# Patient Record
Sex: FEMALE | Race: WHITE | URBAN - METROPOLITAN AREA
[De-identification: names, ages, dates, MRNs, and addresses within clinical notes are randomized per-mention and may not be internally consistent; named-entity substitution may affect disease eponyms.]

---

## 2017-07-17 ENCOUNTER — EMERGENCY (EMERGENCY)
Facility: HOSPITAL | Age: 37
LOS: 0 days | Discharge: HOME | End: 2017-07-18

## 2017-07-17 DIAGNOSIS — R50.9 FEVER, UNSPECIFIED: ICD-10-CM

## 2017-07-17 DIAGNOSIS — E78.00 PURE HYPERCHOLESTEROLEMIA, UNSPECIFIED: ICD-10-CM

## 2017-07-17 DIAGNOSIS — M79.605 PAIN IN LEFT LEG: ICD-10-CM

## 2017-07-17 DIAGNOSIS — R07.89 OTHER CHEST PAIN: ICD-10-CM

## 2017-07-17 DIAGNOSIS — R60.0 LOCALIZED EDEMA: ICD-10-CM

## 2017-07-17 DIAGNOSIS — M79.604 PAIN IN RIGHT LEG: ICD-10-CM

## 2017-07-17 DIAGNOSIS — I10 ESSENTIAL (PRIMARY) HYPERTENSION: ICD-10-CM

## 2017-07-17 DIAGNOSIS — R00.2 PALPITATIONS: ICD-10-CM

## 2017-07-27 PROBLEM — Z00.00 ENCOUNTER FOR PREVENTIVE HEALTH EXAMINATION: Status: ACTIVE | Noted: 2017-07-27

## 2019-03-25 ENCOUNTER — OUTPATIENT (OUTPATIENT)
Dept: OUTPATIENT SERVICES | Facility: HOSPITAL | Age: 39
LOS: 1 days | Discharge: HOME | End: 2019-03-25

## 2019-03-25 DIAGNOSIS — R06.9 UNSPECIFIED ABNORMALITIES OF BREATHING: ICD-10-CM

## 2019-03-31 ENCOUNTER — OUTPATIENT (OUTPATIENT)
Dept: OUTPATIENT SERVICES | Facility: HOSPITAL | Age: 39
LOS: 1 days | Discharge: HOME | End: 2019-03-31

## 2019-03-31 DIAGNOSIS — R10.30 LOWER ABDOMINAL PAIN, UNSPECIFIED: ICD-10-CM

## 2019-04-04 ENCOUNTER — EMERGENCY (EMERGENCY)
Facility: HOSPITAL | Age: 39
LOS: 0 days | Discharge: HOME | End: 2019-04-05
Attending: EMERGENCY MEDICINE | Admitting: PHYSICIAN ASSISTANT
Payer: COMMERCIAL

## 2019-04-04 VITALS
SYSTOLIC BLOOD PRESSURE: 184 MMHG | RESPIRATION RATE: 18 BRPM | TEMPERATURE: 97 F | DIASTOLIC BLOOD PRESSURE: 112 MMHG | OXYGEN SATURATION: 100 % | HEART RATE: 72 BPM

## 2019-04-04 DIAGNOSIS — S09.90XA UNSPECIFIED INJURY OF HEAD, INITIAL ENCOUNTER: ICD-10-CM

## 2019-04-04 DIAGNOSIS — I10 ESSENTIAL (PRIMARY) HYPERTENSION: ICD-10-CM

## 2019-04-04 DIAGNOSIS — R55 SYNCOPE AND COLLAPSE: ICD-10-CM

## 2019-04-04 DIAGNOSIS — R06.02 SHORTNESS OF BREATH: ICD-10-CM

## 2019-04-04 DIAGNOSIS — S89.92XA UNSPECIFIED INJURY OF LEFT LOWER LEG, INITIAL ENCOUNTER: ICD-10-CM
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
## 2019-04-04 DIAGNOSIS — S99.921A UNSPECIFIED INJURY OF RIGHT FOOT, INITIAL ENCOUNTER: ICD-10-CM

## 2019-04-04 LAB
ALBUMIN SERPL ELPH-MCNC: 4.8 G/DL — SIGNIFICANT CHANGE UP (ref 3.5–5.2)
ALP SERPL-CCNC: 90 U/L — SIGNIFICANT CHANGE UP (ref 30–115)
ALT FLD-CCNC: 34 U/L — SIGNIFICANT CHANGE UP (ref 0–41)
ANION GAP SERPL CALC-SCNC: 13 MMOL/L — SIGNIFICANT CHANGE UP (ref 7–14)
APTT BLD: 28.1 SEC — SIGNIFICANT CHANGE UP (ref 27–39.2)
AST SERPL-CCNC: 22 U/L — SIGNIFICANT CHANGE UP (ref 0–41)
BILIRUB SERPL-MCNC: 0.4 MG/DL — SIGNIFICANT CHANGE UP (ref 0.2–1.2)
BUN SERPL-MCNC: 17 MG/DL — SIGNIFICANT CHANGE UP (ref 10–20)
CALCIUM SERPL-MCNC: 10.4 MG/DL — HIGH (ref 8.5–10.1)
CHLORIDE SERPL-SCNC: 98 MMOL/L — SIGNIFICANT CHANGE UP (ref 98–110)
CO2 SERPL-SCNC: 26 MMOL/L — SIGNIFICANT CHANGE UP (ref 17–32)
CREAT SERPL-MCNC: 0.9 MG/DL — SIGNIFICANT CHANGE UP (ref 0.7–1.5)
D DIMER BLD IA.RAPID-MCNC: 108 NG/ML DDU — SIGNIFICANT CHANGE UP (ref 0–230)
GLUCOSE SERPL-MCNC: 109 MG/DL — HIGH (ref 70–99)
HCG SERPL QL: NEGATIVE — SIGNIFICANT CHANGE UP
HCT VFR BLD CALC: 42.5 % — SIGNIFICANT CHANGE UP (ref 37–47)
HGB BLD-MCNC: 14.2 G/DL — SIGNIFICANT CHANGE UP (ref 12–16)
INR BLD: 0.9 RATIO — SIGNIFICANT CHANGE UP (ref 0.65–1.3)
MAGNESIUM SERPL-MCNC: 2.3 MG/DL — SIGNIFICANT CHANGE UP (ref 1.8–2.4)
MCHC RBC-ENTMCNC: 28.5 PG — SIGNIFICANT CHANGE UP (ref 27–31)
MCHC RBC-ENTMCNC: 33.4 G/DL — SIGNIFICANT CHANGE UP (ref 32–37)
MCV RBC AUTO: 85.2 FL — SIGNIFICANT CHANGE UP (ref 81–99)
NRBC # BLD: 0 /100 WBCS — SIGNIFICANT CHANGE UP (ref 0–0)
PLATELET # BLD AUTO: 336 K/UL — SIGNIFICANT CHANGE UP (ref 130–400)
POTASSIUM SERPL-MCNC: 4.4 MMOL/L — SIGNIFICANT CHANGE UP (ref 3.5–5)
POTASSIUM SERPL-SCNC: 4.4 MMOL/L — SIGNIFICANT CHANGE UP (ref 3.5–5)
PROT SERPL-MCNC: 8.2 G/DL — HIGH (ref 6–8)
PROTHROM AB SERPL-ACNC: 10.4 SEC — SIGNIFICANT CHANGE UP (ref 9.95–12.87)
RBC # BLD: 4.99 M/UL — SIGNIFICANT CHANGE UP (ref 4.2–5.4)
RBC # FLD: 12.4 % — SIGNIFICANT CHANGE UP (ref 11.5–14.5)
SODIUM SERPL-SCNC: 137 MMOL/L — SIGNIFICANT CHANGE UP (ref 135–146)
TROPONIN T SERPL-MCNC: <0.01 NG/ML — SIGNIFICANT CHANGE UP
WBC # BLD: 9.53 K/UL — SIGNIFICANT CHANGE UP (ref 4.8–10.8)
WBC # FLD AUTO: 9.53 K/UL — SIGNIFICANT CHANGE UP (ref 4.8–10.8)

## 2019-04-04 PROCEDURE — 73630 X-RAY EXAM OF FOOT: CPT | Mod: 26,RT

## 2019-04-04 PROCEDURE — 73564 X-RAY EXAM KNEE 4 OR MORE: CPT | Mod: 26,LT

## 2019-04-04 PROCEDURE — 70450 CT HEAD/BRAIN W/O DYE: CPT | Mod: 26

## 2019-04-04 PROCEDURE — 36410 VNPNXR 3YR/> PHY/QHP DX/THER: CPT | Mod: 59

## 2019-04-04 PROCEDURE — 99218: CPT | Mod: 25

## 2019-04-04 PROCEDURE — 93010 ELECTROCARDIOGRAM REPORT: CPT

## 2019-04-04 PROCEDURE — 73610 X-RAY EXAM OF ANKLE: CPT | Mod: 26,RT

## 2019-04-04 PROCEDURE — 71045 X-RAY EXAM CHEST 1 VIEW: CPT | Mod: 26

## 2019-04-04 PROCEDURE — 11760 REPAIR OF NAIL BED: CPT

## 2019-04-04 RX ORDER — OXYCODONE AND ACETAMINOPHEN 5; 325 MG/1; MG/1
2 TABLET ORAL ONCE
Qty: 0 | Refills: 0 | Status: DISCONTINUED | OUTPATIENT
Start: 2019-04-04 | End: 2019-04-04

## 2019-04-04 RX ORDER — MORPHINE SULFATE 50 MG/1
4 CAPSULE, EXTENDED RELEASE ORAL ONCE
Qty: 0 | Refills: 0 | Status: DISCONTINUED | OUTPATIENT
Start: 2019-04-04 | End: 2019-04-04

## 2019-04-04 RX ORDER — TETANUS TOXOID, REDUCED DIPHTHERIA TOXOID AND ACELLULAR PERTUSSIS VACCINE, ADSORBED 5; 2.5; 8; 8; 2.5 [IU]/.5ML; [IU]/.5ML; UG/.5ML; UG/.5ML; UG/.5ML
0.5 SUSPENSION INTRAMUSCULAR ONCE
Qty: 0 | Refills: 0 | Status: COMPLETED | OUTPATIENT
Start: 2019-04-04 | End: 2019-04-04

## 2019-04-04 RX ADMIN — Medication 300 MILLIGRAM(S): at 22:51

## 2019-04-04 RX ADMIN — MORPHINE SULFATE 4 MILLIGRAM(S): 50 CAPSULE, EXTENDED RELEASE ORAL at 23:10

## 2019-04-04 RX ADMIN — TETANUS TOXOID, REDUCED DIPHTHERIA TOXOID AND ACELLULAR PERTUSSIS VACCINE, ADSORBED 0.5 MILLILITER(S): 5; 2.5; 8; 8; 2.5 SUSPENSION INTRAMUSCULAR at 22:49

## 2019-04-04 RX ADMIN — MORPHINE SULFATE 4 MILLIGRAM(S): 50 CAPSULE, EXTENDED RELEASE ORAL at 20:54

## 2019-04-04 NOTE — ED PROVIDER NOTE - PROGRESS NOTE DETAILS
pt agreeable to obs.  follow up with podiatry clinic in 1 week.  abx given in ED.  post op shoe given.  xeroform and bacitracin to foot x 1 week.  wash daily.  discussed s/s of infection.

## 2019-04-04 NOTE — ED CDU PROVIDER INITIAL DAY NOTE - ATTENDING CONTRIBUTION TO CARE
38yo woman h/o HTN was placed in CDU for workup of syncope. Pt reports that she was climbing stairs, vision got black, and she passed out. No preceding HA or chest pain, no prior episodes. Pt fell down stairs and injuried R great toe, L knee, and hit head. She was eval in the ED with EKG, labs, CXR which were unremarkable, head CT was negative for injury; great toenail was avulsed but no fx. Wound care was given. Plan in CDU for monitoring, serial enzymes/EKG, echo, reassess.

## 2019-04-04 NOTE — ED PROVIDER NOTE - NS ED ROS FT
Constitutional: See HPI.  Eyes: No visual changes, eye pain or discharge.  ENMT: No hearing changes, pain, discharge or infections.   Cardiac: + syncope. No SOB or edema. No chest pain with exertion.  Respiratory: No cough or respiratory distress. No hemoptysis.   GI: No nausea, vomiting, diarrhea or abdominal pain.  : No dysuria, frequency or burning.  MS: + knee and foot pain. No myalgia, muscle weakness, or back pain, no pain of upper extremities, hip.   Neuro: No headache or weakness.   Skin: No skin rash.  Except as documented in the HPI, all other systems are negative.

## 2019-04-04 NOTE — ED ADULT TRIAGE NOTE - CHIEF COMPLAINT QUOTE
Pt presents s/p fall, pt states "I blacked out and fell down 4 steps". Pt presents s/p fall, pt states "I blacked out and fell down 4 steps".  Pt c/o headaches and right 1st digit toenail imbedded into nailbed

## 2019-04-04 NOTE — ED PROVIDER NOTE - PHYSICAL EXAMINATION
CONST: Well appearing in NAD  HEAD: NC/AT   EYES:  Sclera and conjunctiva clear.  NECK: Non-tender, no midline C/T/L tenderness  CARD: Normal S1 S2; Normal rate and rhythm  RESP: Equal BS B/L, No wheezes, rhonchi or rales. No distress  GI: Soft, non-tender, non-distended.  MS: + TTP left patella, no stepoff or crepitus, + nailbed injury of right great toe with no laceration, + minimal bleeding, no FB, diffuse TTP of R great toe and lateral malleolus, pedal pulse 2+ bilaterally, capillary refill < 2 seconds, Normal ROM in all extremities. No edema of lower extremities, no calf pain, radial pulses 2+ bilaterally  SKIN: Warm, dry, no acute rashes. Good turgor  NEURO: A&Ox3, CN II-XII intact, no focal deficits, no facial asymmetry, no pronator drift, normal finger to nose, no nystagmus, gross sensation intact, UE/LE strength 5/5, stable gait

## 2019-04-04 NOTE — ED PROCEDURE NOTE - ATTENDING CONTRIBUTION TO CARE
I was present for and supervised the key/critical aspects of the procedures performed during the care of the patient or was immediately available to the resident/PA for this procedure.

## 2019-04-04 NOTE — ED CDU PROVIDER INITIAL DAY NOTE - OBJECTIVE STATEMENT
38y/o female with pmh htn, pt. states she had syncopal episode earlier today as she was walking up the stair and she slid down  4-5 steps hitting her head on a railing and injuring her right great toe and left knee. pt. states that over the last 2 weeks she has been having sob and wheezing only at night time. denies cp, nausea, vomiting, incontinence, abdominal pain.

## 2019-04-04 NOTE — ED CDU PROVIDER INITIAL DAY NOTE - MEDICAL DECISION MAKING DETAILS
40yo woman h/o HTN was placed in CDU for workup of syncope. Pt reports that she was climbing stairs, vision got black, and she passed out. No preceding HA or chest pain, no prior episodes. Pt fell down stairs and injuried R great toe, L knee, and hit head. She was eval in the ED with EKG, labs, CXR which were unremarkable, head CT was negative for injury; great toenail was avulsed but no fx. Wound care was given. Plan in CDU for monitoring, serial enzymes/EKG, echo, reassess.

## 2019-04-04 NOTE — ED PROVIDER NOTE - ATTENDING CONTRIBUTION TO CARE
40 yo f with pmh of htn, presents with syncope while going up the stairs.  pt says blacked out and hit her head on the railing and stubbed her right big toe and landed on her left knee.  pt denies asa or AC use.  pt denies dizziness or chest pain prior to syncopal episode.  was well before incident.  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  no neck pain, abd pain, sob, no recent travel, no ocp use.  exam: nad, ncat, perrl, eomi, mmm, rrr, ctab, abd soft, nt,nd aox3, right large toe with partial nail avulsion, left knee ttp patella, FROM with pain, imp: pt with syncopal episode, right toe injury, left knee pain, labs, ekg, cxr, ct head , xray of right foot and left knee

## 2019-04-04 NOTE — ED PROCEDURE NOTE - NS ED PROCEDURE ASSISTED BY
Supervision was available
The procedure was performed independently

## 2019-04-04 NOTE — ED CDU PROVIDER INITIAL DAY NOTE - MUSCULOSKELETAL, MLM
Spine appears normal, range of motion is not limited, + ttp over left knee, no swelling, right toe repaired nailbed.

## 2019-04-04 NOTE — ED ADULT NURSE NOTE - CHIEF COMPLAINT QUOTE
Pt presents s/p fall, pt states "I blacked out and fell down 4 steps".  Pt c/o headaches and right 1st digit toenail imbedded into nailbed

## 2019-04-04 NOTE — ED PROCEDURE NOTE - PROCEDURE ADDITIONAL DETAILS
18 g R forearm placed with US guidance with flash back, labs drawn, line flushed without difficulty.

## 2019-04-04 NOTE — ED PROCEDURE NOTE - GENERAL PROCEDURE DETAILS
nail hanging on by cuticle.  digital block of right great toe and removal of toe nail.  no need for cutting nail.  nail removed cleanly. irrigated and bandaged with bacitracin and xeroform.

## 2019-04-04 NOTE — ED PROCEDURE NOTE - CPROC ED POST PROC CARE GUIDE1
Keep the cast/splint/dressing clean and dry.
Verbal/written post procedure instructions were given to patient/caregiver./Instructed patient/caregiver regarding signs and symptoms of infection./Instructed patient/caregiver to follow-up with primary care physician./Keep the cast/splint/dressing clean and dry.
Verbal/written post procedure instructions were given to patient/caregiver./Keep the cast/splint/dressing clean and dry./Instructed patient/caregiver regarding signs and symptoms of infection./Instructed patient/caregiver to follow-up with primary care physician./Elevate the injured extremity as instructed.

## 2019-04-04 NOTE — ED PROVIDER NOTE - CARE PLAN
Principal Discharge DX:	Syncope  Secondary Diagnosis:	Closed head injury  Secondary Diagnosis:	Knee pain  Secondary Diagnosis:	Tetanus toxoid vaccination administered at current visit  Secondary Diagnosis:	Toe injury, right, initial encounter

## 2019-04-04 NOTE — ED PROVIDER NOTE - CLINICAL SUMMARY MEDICAL DECISION MAKING FREE TEXT BOX
Pt with syncopal episode while walking up stairs, closed head injury but neg ct, +right toe injury with nail avulsion, agreeable to obs for syncope w/u

## 2019-04-04 NOTE — ED PROVIDER NOTE - OBJECTIVE STATEMENT
39 y.o female w/ hx of HTn presents to the ED for evaluation of syncope.  Pt was walking up the stairs, had syncopal episode with no preceding symptoms w/ + head injury on no anticoagulation/ antiplatelets. Also complaining of left knee pain and right great toe pain.  pain is dull/achy, intermittent, throbbing, worse w/ ROM, alleviated with rest, mild severity.  + nailbed injury right great toe. not utd on tdap.

## 2019-04-04 NOTE — ED PROVIDER NOTE - SECONDARY DIAGNOSIS.
Closed head injury Knee pain Tetanus toxoid vaccination administered at current visit Toe injury, right, initial encounter

## 2019-04-05 VITALS
OXYGEN SATURATION: 96 % | HEART RATE: 66 BPM | TEMPERATURE: 98 F | RESPIRATION RATE: 18 BRPM | SYSTOLIC BLOOD PRESSURE: 120 MMHG | DIASTOLIC BLOOD PRESSURE: 77 MMHG

## 2019-04-05 LAB — TROPONIN T SERPL-MCNC: <0.01 NG/ML — SIGNIFICANT CHANGE UP

## 2019-04-05 PROCEDURE — 99217: CPT | Mod: 25

## 2019-04-05 NOTE — ED ADULT NURSE REASSESSMENT NOTE - NS ED NURSE REASSESS COMMENT FT1
patient placed in observation unit for further observation for syncope. Patient placed on cardiac monitor.  Blood draw and sent to lab, result pending. Patient in stable condition and nad.  Will continue to monitor.

## 2019-04-05 NOTE — ED CDU PROVIDER DISPOSITION NOTE - NSFOLLOWUPCLINICS_GEN_ALL_ED_FT
[facility]  Podiatry  .  [place]   Phone: [phone]  Fax:   Follow Up Time: 4-6 Days

## 2019-04-05 NOTE — ED CDU PROVIDER DISPOSITION NOTE - CARE PROVIDERS DIRECT ADDRESSES
,DirectAddress_Unknown,[email or web address]

## 2019-04-05 NOTE — ED CDU PROVIDER DISPOSITION NOTE - ATTENDING CONTRIBUTION TO CARE
40yo woman h/o HTN was placed in CDU for workup of syncope. Pt reports that she was climbing stairs, vision got black, and she passed out. No preceding HA or chest pain, no prior episodes. Pt fell down stairs and injuried R great toe, L knee, and hit head. She was eval in the ED with EKG, labs, CXR which were unremarkable, head CT was negative for injury; great toenail was avulsed but no fx. Wound care was given. Pt monitored without incident, repeat EKG and enzymes unchanged. Echo unremarkable. Pt to f/u podiatry and cardiology, PMD. Clindamycin was eprescribed.

## 2019-04-05 NOTE — ED CDU PROVIDER SUBSEQUENT DAY NOTE - ATTENDING CONTRIBUTION TO CARE
38yo woman h/o HTN was placed in CDU for workup of syncope. Pt reports that she was climbing stairs, vision got black, and she passed out. No preceding HA or chest pain, no prior episodes. Pt fell down stairs and injuried R great toe, L knee, and hit head. She was eval in the ED with EKG, labs, CXR which were unremarkable, head CT was negative for injury; great toenail was avulsed but no fx. Wound care was given. Pt monitored without incident, repeat EKG and enzymes unchanged. Echo unremarkable. Pt to f/u podiatry and cardiology, PMD. Clindamycin was eprescribed.

## 2019-04-05 NOTE — ED CDU PROVIDER DISPOSITION NOTE - CARE PROVIDER_API CALL
your PMD 1-2 days,   Phone: (   )    -  Fax: (   )    -  Follow Up Time:     Sung Cuenca)  Cardiovascular Disease; Internal Medicine  [address] [place] [address]  Phone: [phone]  Fax: [phone]  Follow Up Time: 1-3 Days

## 2019-04-05 NOTE — ED CDU PROVIDER SUBSEQUENT DAY NOTE - PROGRESS NOTE DETAILS
pt. with syncope, 2nd trop pending, needs echo. will continue to reassess. Patient comfortable, no complaints. Patient reports that her BP meds were changed this week. Lisinopril was d/c and 2 new meds were started. Patient to be discharged home with copies of results and outpatient f/u with PMD/cards/podiatry.

## 2019-04-05 NOTE — ED ADULT NURSE REASSESSMENT NOTE - NS ED NURSE REASSESS COMMENT FT1
patient sleeping with no difficulty breathing noted. Patient remains on cardiac monitor.  Patient in stable condition and nad.  Will continue to monitor.

## 2019-04-05 NOTE — ED CDU PROVIDER DISPOSITION NOTE - PROVIDER TOKENS
FREE:[LAST:[your PMD 1-2 days],PHONE:[(   )    -],FAX:[(   )    -]],PROVIDER:[TOKEN:[[id number]],FOLLOWUP:[1-3 Days]]

## 2019-04-05 NOTE — ED CDU PROVIDER DISPOSITION NOTE - NSFOLLOWUPINSTRUCTIONS_ED_ALL_ED_FT
Syncope    Syncope is when you temporarily lose consciousness, also called fainting or passing out. It is caused by a sudden decrease in blood flow to the brain. Even though most causes of syncope are not dangerous, syncope can possibly be a sign of a serious medical problem. Signs that you may be about to faint include feeling dizzy, lightheaded, nausea, visual changes, or cold/clammy skin. Do not drive, operate heavy machinery, or play sports until your health care provider says it is okay.    SEEK IMMEDIATE MEDICAL CARE IF YOU HAVE ANY OF THE FOLLOWING SYMPTOMS: severe headache, pain in your chest/abdomen/back, bleeding from your mouth or rectum, palpitations, shortness of breath, pain with breathing, seizure, confusion, or trouble walking.    Wound Care    Taking care of your wound properly can help to prevent pain and infection. It can also help your wound to heal more quickly.     HOW TO CARE FOR YOUR WOUND   Take or apply over-the-counter and prescription medicines only as told by your health care provider.  If you were prescribed antibiotic medicine, take or apply it as told by your health care provider. Do not stop using the antibiotic even if your condition improves.  Clean the wound each day or as told by your health care provider.  Wash the wound with mild soap and water.  Rinse the wound with water to remove all soap.  Pat the wound dry with a clean towel. Do not rub it.  There are many different ways to close and cover a wound. For example, a wound can be covered with stitches (sutures), skin glue, or adhesive strips. Follow instructions from your health care provider about:  How to take care of your wound.  When and how you should change your bandage (dressing).  When you should remove your dressing.  Removing whatever was used to close your wound.  Check your wound every day for signs of infection. Watch for:  Redness, swelling, or pain.  Fluid, blood, or pus.  Keep the dressing dry until your health care provider says it can be removed. Do not take baths, swim, use a hot tub, or do anything that would put your wound underwater until your health care provider approves.  Raise (elevate) the injured area above the level of your heart while you are sitting or lying down.  Do not scratch or pick at the wound.  Keep all follow-up visits as told by your health care provider. This is important.    SEEK MEDICAL CARE IF:  You received a tetanus shot and you have swelling, severe pain, redness, or bleeding at the injection site.  You have a fever.  Your pain is not controlled with medicine.  You have increased redness, swelling, or pain at the site of your wound.  You have fluid, blood, or pus coming from your wound.  You notice a bad smell coming from your wound or your dressing.    SEEK IMMEDIATE MEDICAL CARE IF:  You have a red streak going away from your wound.

## 2019-04-09 PROBLEM — I10 ESSENTIAL (PRIMARY) HYPERTENSION: Chronic | Status: ACTIVE | Noted: 2019-04-04

## 2019-04-12 ENCOUNTER — APPOINTMENT (OUTPATIENT)
Dept: PODIATRY | Facility: CLINIC | Age: 39
End: 2019-04-12
Payer: COMMERCIAL

## 2019-04-12 ENCOUNTER — OUTPATIENT (OUTPATIENT)
Dept: OUTPATIENT SERVICES | Facility: HOSPITAL | Age: 39
LOS: 1 days | Discharge: HOME | End: 2019-04-12

## 2019-04-12 DIAGNOSIS — G89.29 PAIN IN RIGHT FOOT: ICD-10-CM

## 2019-04-12 DIAGNOSIS — M79.671 PAIN IN RIGHT FOOT: ICD-10-CM

## 2019-04-12 DIAGNOSIS — Z82.49 FAMILY HISTORY OF ISCHEMIC HEART DISEASE AND OTHER DISEASES OF THE CIRCULATORY SYSTEM: ICD-10-CM

## 2019-04-12 DIAGNOSIS — S99.929A UNSPECIFIED INJURY OF UNSPECIFIED FOOT, INITIAL ENCOUNTER: ICD-10-CM

## 2019-04-12 PROCEDURE — 99203 OFFICE O/P NEW LOW 30 MIN: CPT

## 2019-04-12 RX ORDER — MELOXICAM 15 MG/1
15 TABLET ORAL
Qty: 15 | Refills: 0 | Status: ACTIVE | COMMUNITY
Start: 2019-04-12 | End: 1900-01-01

## 2019-04-15 PROBLEM — S99.929A: Status: ACTIVE | Noted: 2019-04-15

## 2019-04-15 PROBLEM — M79.671 CHRONIC PAIN OF RIGHT HEEL: Status: ACTIVE | Noted: 2019-04-12

## 2019-04-15 NOTE — HISTORY OF PRESENT ILLNESS
[FreeTextEntry1] : A 40 y/o female presents to clinic and was referred by ED doctor. she states that she lost consciousness and fell down at home last Thursday and injured her right 1st toenail.  she has history of high blood pressure and has an appt with cardiologist. she also complaints of right chronic heel pain, she states that she has this right chronic heel pain prior to incident.

## 2019-04-15 NOTE — REASON FOR VISIT
[Initial Evaluation] : an initial evaluation [FreeTextEntry1] : total nail avulsion right 1st toenail  due to trauma 4/4/19

## 2019-04-15 NOTE — PHYSICAL EXAM
[General Appearance - Alert] : alert [Full Pulse] : the pedal pulses are present [Oriented To Time, Place, And Person] : oriented to person, place, and time [FreeTextEntry1] : pain on palpation right hallux dorsally and right plantar heel.

## 2019-05-02 ENCOUNTER — APPOINTMENT (OUTPATIENT)
Dept: PODIATRY | Facility: CLINIC | Age: 39
End: 2019-05-02

## 2019-11-24 ENCOUNTER — EMERGENCY (EMERGENCY)
Facility: HOSPITAL | Age: 39
LOS: 0 days | Discharge: HOME | End: 2019-11-25
Attending: STUDENT IN AN ORGANIZED HEALTH CARE EDUCATION/TRAINING PROGRAM | Admitting: STUDENT IN AN ORGANIZED HEALTH CARE EDUCATION/TRAINING PROGRAM
Payer: COMMERCIAL

## 2019-11-24 VITALS
HEART RATE: 115 BPM | SYSTOLIC BLOOD PRESSURE: 200 MMHG | TEMPERATURE: 102 F | DIASTOLIC BLOOD PRESSURE: 100 MMHG | WEIGHT: 229.94 LBS | RESPIRATION RATE: 22 BRPM | OXYGEN SATURATION: 97 % | HEIGHT: 65 IN

## 2019-11-24 DIAGNOSIS — R50.9 FEVER, UNSPECIFIED: ICD-10-CM

## 2019-11-24 DIAGNOSIS — R07.89 OTHER CHEST PAIN: ICD-10-CM

## 2019-11-24 DIAGNOSIS — I10 ESSENTIAL (PRIMARY) HYPERTENSION: ICD-10-CM

## 2019-11-24 DIAGNOSIS — R06.02 SHORTNESS OF BREATH: ICD-10-CM

## 2019-11-24 DIAGNOSIS — M54.9 DORSALGIA, UNSPECIFIED: ICD-10-CM

## 2019-11-24 DIAGNOSIS — Z87.891 PERSONAL HISTORY OF NICOTINE DEPENDENCE: ICD-10-CM

## 2019-11-24 DIAGNOSIS — R09.89 OTHER SPECIFIED SYMPTOMS AND SIGNS INVOLVING THE CIRCULATORY AND RESPIRATORY SYSTEMS: ICD-10-CM

## 2019-11-24 DIAGNOSIS — R10.84 GENERALIZED ABDOMINAL PAIN: ICD-10-CM

## 2019-11-24 DIAGNOSIS — Z88.0 ALLERGY STATUS TO PENICILLIN: ICD-10-CM

## 2019-11-24 DIAGNOSIS — Z88.8 ALLERGY STATUS TO OTHER DRUGS, MEDICAMENTS AND BIOLOGICAL SUBSTANCES STATUS: ICD-10-CM

## 2019-11-24 DIAGNOSIS — R05 COUGH: ICD-10-CM

## 2019-11-24 LAB
ALBUMIN SERPL ELPH-MCNC: 4.7 G/DL — SIGNIFICANT CHANGE UP (ref 3.5–5.2)
ALP SERPL-CCNC: 74 U/L — SIGNIFICANT CHANGE UP (ref 30–115)
ALT FLD-CCNC: 27 U/L — SIGNIFICANT CHANGE UP (ref 0–41)
ANION GAP SERPL CALC-SCNC: 18 MMOL/L — HIGH (ref 7–14)
APPEARANCE UR: CLEAR — SIGNIFICANT CHANGE UP
AST SERPL-CCNC: 24 U/L — SIGNIFICANT CHANGE UP (ref 0–41)
BACTERIA # UR AUTO: ABNORMAL
BASE EXCESS BLDV CALC-SCNC: 2.8 MMOL/L — HIGH (ref -2–2)
BASOPHILS # BLD AUTO: 0.02 K/UL — SIGNIFICANT CHANGE UP (ref 0–0.2)
BASOPHILS NFR BLD AUTO: 0.2 % — SIGNIFICANT CHANGE UP (ref 0–1)
BILIRUB SERPL-MCNC: 0.8 MG/DL — SIGNIFICANT CHANGE UP (ref 0.2–1.2)
BILIRUB UR-MCNC: NEGATIVE — SIGNIFICANT CHANGE UP
BUN SERPL-MCNC: 11 MG/DL — SIGNIFICANT CHANGE UP (ref 10–20)
CA-I SERPL-SCNC: 1.22 MMOL/L — SIGNIFICANT CHANGE UP (ref 1.12–1.3)
CALCIUM SERPL-MCNC: 9.6 MG/DL — SIGNIFICANT CHANGE UP (ref 8.5–10.1)
CHLORIDE SERPL-SCNC: 95 MMOL/L — LOW (ref 98–110)
CO2 SERPL-SCNC: 23 MMOL/L — SIGNIFICANT CHANGE UP (ref 17–32)
COLOR SPEC: SIGNIFICANT CHANGE UP
CREAT SERPL-MCNC: 1 MG/DL — SIGNIFICANT CHANGE UP (ref 0.7–1.5)
DIFF PNL FLD: ABNORMAL
EOSINOPHIL # BLD AUTO: 0.07 K/UL — SIGNIFICANT CHANGE UP (ref 0–0.7)
EOSINOPHIL NFR BLD AUTO: 0.7 % — SIGNIFICANT CHANGE UP (ref 0–8)
EPI CELLS # UR: 6 /HPF — HIGH (ref 0–5)
GAS PNL BLDV: 137 MMOL/L — SIGNIFICANT CHANGE UP (ref 136–145)
GAS PNL BLDV: SIGNIFICANT CHANGE UP
GLUCOSE SERPL-MCNC: 135 MG/DL — HIGH (ref 70–99)
GLUCOSE UR QL: NEGATIVE — SIGNIFICANT CHANGE UP
HCG SERPL-ACNC: <0.6 MIU/ML — SIGNIFICANT CHANGE UP
HCO3 BLDV-SCNC: 28 MMOL/L — SIGNIFICANT CHANGE UP (ref 22–29)
HCT VFR BLD CALC: 41.9 % — SIGNIFICANT CHANGE UP (ref 37–47)
HCT VFR BLDA CALC: 44.2 % — HIGH (ref 34–44)
HGB BLD CALC-MCNC: 14.4 G/DL — SIGNIFICANT CHANGE UP (ref 14–18)
HGB BLD-MCNC: 13.6 G/DL — SIGNIFICANT CHANGE UP (ref 12–16)
HYALINE CASTS # UR AUTO: 2 /LPF — SIGNIFICANT CHANGE UP (ref 0–7)
IMM GRANULOCYTES NFR BLD AUTO: 0.4 % — HIGH (ref 0.1–0.3)
KETONES UR-MCNC: NEGATIVE — SIGNIFICANT CHANGE UP
LACTATE BLDV-MCNC: 2.7 MMOL/L — HIGH (ref 0.5–1.6)
LEUKOCYTE ESTERASE UR-ACNC: NEGATIVE — SIGNIFICANT CHANGE UP
LIDOCAIN IGE QN: 20 U/L — SIGNIFICANT CHANGE UP (ref 7–60)
LYMPHOCYTES # BLD AUTO: 0.42 K/UL — LOW (ref 1.2–3.4)
LYMPHOCYTES # BLD AUTO: 4.1 % — LOW (ref 20.5–51.1)
MAGNESIUM SERPL-MCNC: 1.8 MG/DL — SIGNIFICANT CHANGE UP (ref 1.8–2.4)
MCHC RBC-ENTMCNC: 28.3 PG — SIGNIFICANT CHANGE UP (ref 27–31)
MCHC RBC-ENTMCNC: 32.5 G/DL — SIGNIFICANT CHANGE UP (ref 32–37)
MCV RBC AUTO: 87.3 FL — SIGNIFICANT CHANGE UP (ref 81–99)
MONOCYTES # BLD AUTO: 0.6 K/UL — SIGNIFICANT CHANGE UP (ref 0.1–0.6)
MONOCYTES NFR BLD AUTO: 5.9 % — SIGNIFICANT CHANGE UP (ref 1.7–9.3)
NEUTROPHILS # BLD AUTO: 9.09 K/UL — HIGH (ref 1.4–6.5)
NEUTROPHILS NFR BLD AUTO: 88.7 % — HIGH (ref 42.2–75.2)
NITRITE UR-MCNC: NEGATIVE — SIGNIFICANT CHANGE UP
NRBC # BLD: 0 /100 WBCS — SIGNIFICANT CHANGE UP (ref 0–0)
PCO2 BLDV: 46 MMHG — SIGNIFICANT CHANGE UP (ref 41–51)
PH BLDV: 7.4 — SIGNIFICANT CHANGE UP (ref 7.26–7.43)
PH UR: 7 — SIGNIFICANT CHANGE UP (ref 5–8)
PHOSPHATE SERPL-MCNC: 2.5 MG/DL — SIGNIFICANT CHANGE UP (ref 2.1–4.9)
PLATELET # BLD AUTO: 277 K/UL — SIGNIFICANT CHANGE UP (ref 130–400)
PO2 BLDV: 27 MMHG — SIGNIFICANT CHANGE UP (ref 20–40)
POTASSIUM BLDV-SCNC: 3.8 MMOL/L — SIGNIFICANT CHANGE UP (ref 3.3–5.6)
POTASSIUM SERPL-MCNC: 4.2 MMOL/L — SIGNIFICANT CHANGE UP (ref 3.5–5)
POTASSIUM SERPL-SCNC: 4.2 MMOL/L — SIGNIFICANT CHANGE UP (ref 3.5–5)
PROT SERPL-MCNC: 7.8 G/DL — SIGNIFICANT CHANGE UP (ref 6–8)
PROT UR-MCNC: NEGATIVE — SIGNIFICANT CHANGE UP
RBC # BLD: 4.8 M/UL — SIGNIFICANT CHANGE UP (ref 4.2–5.4)
RBC # FLD: 12.6 % — SIGNIFICANT CHANGE UP (ref 11.5–14.5)
RBC CASTS # UR COMP ASSIST: 21 /HPF — HIGH (ref 0–4)
SAO2 % BLDV: 50 % — SIGNIFICANT CHANGE UP
SODIUM SERPL-SCNC: 136 MMOL/L — SIGNIFICANT CHANGE UP (ref 135–146)
SP GR SPEC: 1.01 — LOW (ref 1.01–1.02)
TROPONIN T SERPL-MCNC: <0.01 NG/ML — SIGNIFICANT CHANGE UP
UROBILINOGEN FLD QL: SIGNIFICANT CHANGE UP
WBC # BLD: 10.24 K/UL — SIGNIFICANT CHANGE UP (ref 4.8–10.8)
WBC # FLD AUTO: 10.24 K/UL — SIGNIFICANT CHANGE UP (ref 4.8–10.8)
WBC UR QL: 3 /HPF — SIGNIFICANT CHANGE UP (ref 0–5)

## 2019-11-24 PROCEDURE — 93010 ELECTROCARDIOGRAM REPORT: CPT

## 2019-11-24 PROCEDURE — 71045 X-RAY EXAM CHEST 1 VIEW: CPT | Mod: 26

## 2019-11-24 PROCEDURE — 99285 EMERGENCY DEPT VISIT HI MDM: CPT

## 2019-11-24 RX ORDER — SODIUM CHLORIDE 9 MG/ML
1000 INJECTION, SOLUTION INTRAVENOUS ONCE
Refills: 0 | Status: DISCONTINUED | OUTPATIENT
Start: 2019-11-24 | End: 2019-11-24

## 2019-11-24 RX ORDER — MORPHINE SULFATE 50 MG/1
4 CAPSULE, EXTENDED RELEASE ORAL ONCE
Refills: 0 | Status: DISCONTINUED | OUTPATIENT
Start: 2019-11-24 | End: 2019-11-24

## 2019-11-24 RX ORDER — SODIUM CHLORIDE 9 MG/ML
3000 INJECTION, SOLUTION INTRAVENOUS ONCE
Refills: 0 | Status: COMPLETED | OUTPATIENT
Start: 2019-11-24 | End: 2019-11-24

## 2019-11-24 RX ORDER — MEROPENEM 1 G/30ML
1000 INJECTION INTRAVENOUS ONCE
Refills: 0 | Status: COMPLETED | OUTPATIENT
Start: 2019-11-24 | End: 2019-11-24

## 2019-11-24 RX ORDER — ACETAMINOPHEN 500 MG
650 TABLET ORAL ONCE
Refills: 0 | Status: COMPLETED | OUTPATIENT
Start: 2019-11-24 | End: 2019-11-24

## 2019-11-24 RX ORDER — VANCOMYCIN HCL 1 G
1000 VIAL (EA) INTRAVENOUS ONCE
Refills: 0 | Status: COMPLETED | OUTPATIENT
Start: 2019-11-24 | End: 2019-11-24

## 2019-11-24 RX ORDER — SODIUM CHLORIDE 9 MG/ML
2000 INJECTION INTRAMUSCULAR; INTRAVENOUS; SUBCUTANEOUS ONCE
Refills: 0 | Status: DISCONTINUED | OUTPATIENT
Start: 2019-11-24 | End: 2019-11-24

## 2019-11-24 RX ADMIN — MEROPENEM 100 MILLIGRAM(S): 1 INJECTION INTRAVENOUS at 22:50

## 2019-11-24 RX ADMIN — MORPHINE SULFATE 4 MILLIGRAM(S): 50 CAPSULE, EXTENDED RELEASE ORAL at 22:08

## 2019-11-24 RX ADMIN — Medication 650 MILLIGRAM(S): at 20:52

## 2019-11-24 RX ADMIN — SODIUM CHLORIDE 1500 MILLILITER(S): 9 INJECTION, SOLUTION INTRAVENOUS at 21:58

## 2019-11-24 RX ADMIN — Medication 250 MILLIGRAM(S): at 23:10

## 2019-11-24 NOTE — ED PROVIDER NOTE - PLAN OF CARE
pt w/ multiple sx including CP rad to back, sharp in nature w/ assoc htn, tachycardia. pt also w/ fever, source unclear. only RF for dissection hx smoking otherwise PE w/o FND or vascular deficits. will workup for sepsis, give empiric fluids/abx, blood/urine ctx, cta dissection given description of pain and to further assess for source

## 2019-11-24 NOTE — ED PROVIDER NOTE - CARE PLAN
Assessment and plan of treatment:	pt w/ multiple sx including CP rad to back, sharp in nature w/ assoc htn, tachycardia. pt also w/ fever, source unclear. only RF for dissection hx smoking otherwise PE w/o FND or vascular deficits. will workup for sepsis, give empiric fluids/abx, blood/urine ctx, cta dissection given description of pain and to further assess for source Principal Discharge DX:	Fever, unspecified fever cause  Assessment and plan of treatment:	pt w/ multiple sx including CP rad to back, sharp in nature w/ assoc htn, tachycardia. pt also w/ fever, source unclear. only RF for dissection hx smoking otherwise PE w/o FND or vascular deficits. will workup for sepsis, give empiric fluids/abx, blood/urine ctx, cta dissection given description of pain and to further assess for source  Secondary Diagnosis:	Chest pain, unspecified type  Secondary Diagnosis:	Back pain, unspecified back location, unspecified back pain laterality, unspecified chronicity

## 2019-11-24 NOTE — ED PROVIDER NOTE - NSFOLLOWUPINSTRUCTIONS_ED_ALL_ED_FT
Take ibuprofen every 6 hours for pain and fever. Take albuterol every 4-6 hours for chest tightness or wheeze. Follow up with your doctor in 2-3 days for reassessment. Return for worsening symptoms, passing out, severe chest pain, or other concerning symptoms.    Fever    A fever is an increase in the body's temperature above 100.4°F (38°C) or higher. In adults and children older than three months, a brief mild or moderate fever generally has no long-term effect, and it usually does not require treatment. Many times, fevers are the result of viral infections, which are self-resolving.  However, certain symptoms or diagnostic tests may suggest a bacterial infection that may respond to antibiotics. Take medications as directed by your health care provider.    SEEK IMMEDIATE MEDICAL CARE IF YOU OR YOUR CHILD HAVE ANY OF THE FOLLOWING SYMPTOMS : shortness of breath, seizure, rash/stiff neck/headache, severe abdominal pain, persistent vomiting, any signs of dehydration, or if your child has a fever for over five (5) days.        Chest Pain    Chest pain can be caused by many different conditions which may or may not be dangerous. Causes include heartburn, lung infections, heart attack, blood clot in lungs, skin infections, strain or damage to muscle, cartilage, or bones, etc. In addition to a history and physical examination, an electrocardiogram (ECG) or other lab tests may have been performed to determine the cause of your chest pain. Follow up with your primary care provider or with a cardiologist as instructed.     SEEK IMMEDIATE MEDICAL CARE IF YOU HAVE ANY OF THE FOLLOWING SYMPTOMS: worsening chest pain, coughing up blood, unexplained back/neck/jaw pain, severe abdominal pain, dizziness or lightheadedness, fainting, shortness of breath, sweaty or clammy skin, vomiting, or racing heart beat. These symptoms may represent a serious problem that is an emergency. Do not wait to see if the symptoms will go away. Get medical help right away. Call 911 and do not drive yourself to the hospital.

## 2019-11-24 NOTE — ED PROVIDER NOTE - NSFOLLOWUPCLINICS_GEN_ALL_ED_FT
[facility]  Medicine  [address], [place]   Phone: [phone]  Fax:   Follow Up Time:

## 2019-11-24 NOTE — ED PROVIDER NOTE - NS ED ROS FT
Constitutional: pos fever no rigors  Eyes: no eye redness, acute visual change  ENMT: no ear pain, no throat pain  Card: pos chest pain, no palpitations  Pulm: pos cough, pos shortness of breath  GI: pos abdominal pain, nonausea or vomiting  : no dysuria or hematuria  MSK: no limitation in range of motion, no neck pain, + myalgia  Skin: no rash, no abrasion  Neuro: no numbness, no weakness  Heme/Onc: no easy bruising, no bleeding tendency   Allergic: no hives, no throat swelling

## 2019-11-24 NOTE — ED ADULT NURSE NOTE - NSIMPLEMENTINTERV_GEN_ALL_ED
Implemented All Universal Safety Interventions:  [place] to call system. Call bell, personal items and telephone within reach. Instruct patient to call for assistance. Room bathroom lighting operational. Non-slip footwear when patient is off stretcher. Physically safe environment: no spills, clutter or unnecessary equipment. Stretcher in lowest position, wheels locked, appropriate side rails in place.

## 2019-11-24 NOTE — ED PROVIDER NOTE - CLINICAL SUMMARY MEDICAL DECISION MAKING FREE TEXT BOX
40 yo f no pmh here for fever, cough, wheezing and back pain. pt initially met sirs criteria and initially there was c/f bacterial infection so fluids/abx ordered empirically. pt w/u for source of infection- no evidence of bacterial infection to chest, abd, pelvis, urine. 2 trop negative, ekg nonischaemic x2. pain likely myalgia/related to cough. pt feeling better w/ fluids, albuterol. vs improved. pt comfortable w/ d/c w/ supportive care measures.

## 2019-11-24 NOTE — ED PROVIDER NOTE - PROGRESS NOTE DETAILS
Sepsis suspected at this time. BSABX unknown source ordered, pcn allergy  IVF bolus cannot be given due to: ( ) CHF ( ) CRF ( ) Hospice or comfort measures only ( ) Patient refusal pt feeling much better, VS improving. no evidence of bacterial infection on ct chest, abd, pelvis or ua. flu negative. more likely viral process. no leukocytosis. lactate mildly elevated. will continue to monitor. 2 trop to r/o acs which is less likely. pt feeling well, wheezing, chest tightness improved w/ albuterol. vss, stable for d/c

## 2019-11-24 NOTE — ED ADULT NURSE NOTE - OBJECTIVE STATEMENT
patient complaints of highblood pressure and chest pain that radiates to her back. Patient deneis n/v. Patient reports SOB on exercetion.

## 2019-11-24 NOTE — ED PROVIDER NOTE - SECONDARY DIAGNOSIS.
Chest pain, unspecified type Back pain, unspecified back location, unspecified back pain laterality, unspecified chronicity

## 2019-11-24 NOTE — ED PROVIDER NOTE - PHYSICAL EXAMINATION
PHYSICAL EXAM:    Constitutional: awake, alert, uncomfortable appearing  Eyes: EOMI, no conj injection  HENT: NC AT  Back: no c/t/l spine ttp  Respiratory: no respiratory distress, mild tachypnea, breath sounds equal b/l, no wheezing, rhonchi or stridor.   Cardiovascular: RRR nml S1S2  Gastrointestinal: soft, no masses, nontender, nondistended. No guarding or rebound. no cvat  Extremities: no peripheral edema, radial pulses 2+ b/l  Neurological: AAOx3, CN II-XII grossly intact, no focal numbness or weakness  Skin: no rash  Musculoskeletal: no gross deformity

## 2019-11-24 NOTE — ED PROVIDER NOTE - PATIENT PORTAL LINK FT
You can access the FollowMyHealth Patient Portal offered by [facility] by registering at the following website: [email or web address]. By joining [employer]’s FollowMyHealth portal, you will also be able to view your health information using other applications (apps) compatible with our system.

## 2019-11-24 NOTE — ED PROVIDER NOTE - OBJECTIVE STATEMENT
40 yo f hx htn here for multiple complaints. pt states this morning she developed sternal chest pain which was sharp. pt states sx radiated to the back. pt endorsing assoc sob and mild wheeze. no cough, congestion, or dysuria. mild diffuse abdo pain.  no ha or neck pain.  pt states she developed a fever today as well and has associated myalgias. 40 yo f hx htn here for multiple complaints. pt states this morning she developed sternal chest pain which was sharp. pt states sx radiated to the back. pt endorsing assoc sob and mild wheeze. mild cough and congestion, no dysuria. mild diffuse abdo pain.  no ha or neck pain.  pt states she developed a fever today as well and has associated myalgias.

## 2019-11-25 VITALS
OXYGEN SATURATION: 98 % | RESPIRATION RATE: 18 BRPM | SYSTOLIC BLOOD PRESSURE: 115 MMHG | DIASTOLIC BLOOD PRESSURE: 54 MMHG | HEART RATE: 97 BPM | TEMPERATURE: 99 F

## 2019-11-25 LAB
FLU A RESULT: NEGATIVE — SIGNIFICANT CHANGE UP
FLU A RESULT: NEGATIVE — SIGNIFICANT CHANGE UP
FLUAV AG NPH QL: NEGATIVE — SIGNIFICANT CHANGE UP
FLUBV AG NPH QL: NEGATIVE — SIGNIFICANT CHANGE UP
RSV RESULT: NEGATIVE — SIGNIFICANT CHANGE UP
RSV RNA RESP QL NAA+PROBE: NEGATIVE — SIGNIFICANT CHANGE UP
TROPONIN T SERPL-MCNC: <0.01 NG/ML — SIGNIFICANT CHANGE UP

## 2019-11-25 PROCEDURE — 71275 CT ANGIOGRAPHY CHEST: CPT | Mod: 26

## 2019-11-25 PROCEDURE — 93010 ELECTROCARDIOGRAM REPORT: CPT

## 2019-11-25 RX ORDER — IBUPROFEN 200 MG
1 TABLET ORAL
Qty: 30 | Refills: 0
Start: 2019-11-25

## 2019-11-25 RX ORDER — ACETAMINOPHEN 500 MG
650 TABLET ORAL ONCE
Refills: 0 | Status: COMPLETED | OUTPATIENT
Start: 2019-11-25 | End: 2019-11-25

## 2019-11-25 RX ORDER — ALBUTEROL 90 UG/1
2 AEROSOL, METERED ORAL
Qty: 1 | Refills: 0
Start: 2019-11-25

## 2019-11-25 RX ORDER — IPRATROPIUM/ALBUTEROL SULFATE 18-103MCG
3 AEROSOL WITH ADAPTER (GRAM) INHALATION
Refills: 0 | Status: COMPLETED | OUTPATIENT
Start: 2019-11-25 | End: 2019-11-25

## 2019-11-25 RX ORDER — SODIUM CHLORIDE 9 MG/ML
1000 INJECTION, SOLUTION INTRAVENOUS ONCE
Refills: 0 | Status: COMPLETED | OUTPATIENT
Start: 2019-11-25 | End: 2019-11-25

## 2019-11-25 RX ORDER — KETOROLAC TROMETHAMINE 30 MG/ML
15 SYRINGE (ML) INJECTION ONCE
Refills: 0 | Status: DISCONTINUED | OUTPATIENT
Start: 2019-11-25 | End: 2019-11-25

## 2019-11-25 RX ADMIN — Medication 15 MILLIGRAM(S): at 01:29

## 2019-11-25 RX ADMIN — Medication 650 MILLIGRAM(S): at 02:58

## 2019-11-25 RX ADMIN — Medication 3 MILLILITER(S): at 01:30

## 2019-11-25 RX ADMIN — Medication 3 MILLILITER(S): at 01:37

## 2019-11-25 RX ADMIN — SODIUM CHLORIDE 1000 MILLILITER(S): 9 INJECTION, SOLUTION INTRAVENOUS at 02:58

## 2019-11-25 RX ADMIN — Medication 3 MILLILITER(S): at 01:15

## 2019-11-26 LAB
CULTURE RESULTS: SIGNIFICANT CHANGE UP
SPECIMEN SOURCE: SIGNIFICANT CHANGE UP

## 2019-11-30 LAB
CULTURE RESULTS: SIGNIFICANT CHANGE UP
CULTURE RESULTS: SIGNIFICANT CHANGE UP
SPECIMEN SOURCE: SIGNIFICANT CHANGE UP
SPECIMEN SOURCE: SIGNIFICANT CHANGE UP

## 2020-11-17 ENCOUNTER — EMERGENCY (EMERGENCY)
Facility: HOSPITAL | Age: 40
LOS: 0 days | Discharge: HOME | End: 2020-11-17
Attending: EMERGENCY MEDICINE | Admitting: EMERGENCY MEDICINE
Payer: COMMERCIAL

## 2020-11-17 VITALS
TEMPERATURE: 98 F | DIASTOLIC BLOOD PRESSURE: 70 MMHG | OXYGEN SATURATION: 95 % | SYSTOLIC BLOOD PRESSURE: 128 MMHG | HEIGHT: 65 IN | HEART RATE: 84 BPM | RESPIRATION RATE: 16 BRPM

## 2020-11-17 DIAGNOSIS — R55 SYNCOPE AND COLLAPSE: ICD-10-CM

## 2020-11-17 DIAGNOSIS — Z20.828 CONTACT WITH AND (SUSPECTED) EXPOSURE TO OTHER VIRAL COMMUNICABLE DISEASES: ICD-10-CM

## 2020-11-17 DIAGNOSIS — Z88.0 ALLERGY STATUS TO PENICILLIN: ICD-10-CM

## 2020-11-17 DIAGNOSIS — J18.9 PNEUMONIA, UNSPECIFIED ORGANISM: ICD-10-CM

## 2020-11-17 LAB
ALBUMIN SERPL ELPH-MCNC: 4.6 G/DL — SIGNIFICANT CHANGE UP (ref 3.5–5.2)
ALP SERPL-CCNC: 67 U/L — SIGNIFICANT CHANGE UP (ref 30–115)
ALT FLD-CCNC: 38 U/L — SIGNIFICANT CHANGE UP (ref 0–41)
ANION GAP SERPL CALC-SCNC: 12 MMOL/L — SIGNIFICANT CHANGE UP (ref 7–14)
APPEARANCE UR: ABNORMAL
APTT BLD: 24.7 SEC — LOW (ref 27–39.2)
AST SERPL-CCNC: 37 U/L — SIGNIFICANT CHANGE UP (ref 0–41)
BACTERIA # UR AUTO: ABNORMAL
BASOPHILS # BLD AUTO: 0.01 K/UL — SIGNIFICANT CHANGE UP (ref 0–0.2)
BASOPHILS NFR BLD AUTO: 0.3 % — SIGNIFICANT CHANGE UP (ref 0–1)
BILIRUB SERPL-MCNC: 0.8 MG/DL — SIGNIFICANT CHANGE UP (ref 0.2–1.2)
BILIRUB UR-MCNC: NEGATIVE — SIGNIFICANT CHANGE UP
BUN SERPL-MCNC: 13 MG/DL — SIGNIFICANT CHANGE UP (ref 10–20)
CALCIUM SERPL-MCNC: 8.9 MG/DL — SIGNIFICANT CHANGE UP (ref 8.5–10.1)
CHLORIDE SERPL-SCNC: 97 MMOL/L — LOW (ref 98–110)
CO2 SERPL-SCNC: 27 MMOL/L — SIGNIFICANT CHANGE UP (ref 17–32)
COLOR SPEC: YELLOW — SIGNIFICANT CHANGE UP
CREAT SERPL-MCNC: 1 MG/DL — SIGNIFICANT CHANGE UP (ref 0.7–1.5)
DIFF PNL FLD: ABNORMAL
EOSINOPHIL # BLD AUTO: 0.02 K/UL — SIGNIFICANT CHANGE UP (ref 0–0.7)
EOSINOPHIL NFR BLD AUTO: 0.6 % — SIGNIFICANT CHANGE UP (ref 0–8)
EPI CELLS # UR: 20 /HPF — HIGH (ref 0–5)
GLUCOSE SERPL-MCNC: 106 MG/DL — HIGH (ref 70–99)
GLUCOSE UR QL: NEGATIVE — SIGNIFICANT CHANGE UP
HCG SERPL QL: NEGATIVE — SIGNIFICANT CHANGE UP
HCT VFR BLD CALC: 42.9 % — SIGNIFICANT CHANGE UP (ref 37–47)
HGB BLD-MCNC: 14.1 G/DL — SIGNIFICANT CHANGE UP (ref 12–16)
HYALINE CASTS # UR AUTO: 6 /LPF — SIGNIFICANT CHANGE UP (ref 0–7)
IMM GRANULOCYTES NFR BLD AUTO: 0.3 % — SIGNIFICANT CHANGE UP (ref 0.1–0.3)
INR BLD: 1.1 RATIO — SIGNIFICANT CHANGE UP (ref 0.65–1.3)
KETONES UR-MCNC: NEGATIVE — SIGNIFICANT CHANGE UP
LEUKOCYTE ESTERASE UR-ACNC: ABNORMAL
LIDOCAIN IGE QN: 25 U/L — SIGNIFICANT CHANGE UP (ref 7–60)
LYMPHOCYTES # BLD AUTO: 0.86 K/UL — LOW (ref 1.2–3.4)
LYMPHOCYTES # BLD AUTO: 26.5 % — SIGNIFICANT CHANGE UP (ref 20.5–51.1)
MAGNESIUM SERPL-MCNC: 2.1 MG/DL — SIGNIFICANT CHANGE UP (ref 1.8–2.4)
MCHC RBC-ENTMCNC: 28.4 PG — SIGNIFICANT CHANGE UP (ref 27–31)
MCHC RBC-ENTMCNC: 32.9 G/DL — SIGNIFICANT CHANGE UP (ref 32–37)
MCV RBC AUTO: 86.5 FL — SIGNIFICANT CHANGE UP (ref 81–99)
MONOCYTES # BLD AUTO: 0.28 K/UL — SIGNIFICANT CHANGE UP (ref 0.1–0.6)
MONOCYTES NFR BLD AUTO: 8.6 % — SIGNIFICANT CHANGE UP (ref 1.7–9.3)
NEUTROPHILS # BLD AUTO: 2.07 K/UL — SIGNIFICANT CHANGE UP (ref 1.4–6.5)
NEUTROPHILS NFR BLD AUTO: 63.7 % — SIGNIFICANT CHANGE UP (ref 42.2–75.2)
NITRITE UR-MCNC: NEGATIVE — SIGNIFICANT CHANGE UP
NRBC # BLD: 0 /100 WBCS — SIGNIFICANT CHANGE UP (ref 0–0)
PH UR: 7 — SIGNIFICANT CHANGE UP (ref 5–8)
PLATELET # BLD AUTO: 193 K/UL — SIGNIFICANT CHANGE UP (ref 130–400)
POTASSIUM SERPL-MCNC: 3.6 MMOL/L — SIGNIFICANT CHANGE UP (ref 3.5–5)
POTASSIUM SERPL-SCNC: 3.6 MMOL/L — SIGNIFICANT CHANGE UP (ref 3.5–5)
PROT SERPL-MCNC: 7.5 G/DL — SIGNIFICANT CHANGE UP (ref 6–8)
PROT UR-MCNC: ABNORMAL
PROTHROM AB SERPL-ACNC: 12.7 SEC — SIGNIFICANT CHANGE UP (ref 9.95–12.87)
RBC # BLD: 4.96 M/UL — SIGNIFICANT CHANGE UP (ref 4.2–5.4)
RBC # FLD: 12.8 % — SIGNIFICANT CHANGE UP (ref 11.5–14.5)
RBC CASTS # UR COMP ASSIST: 4 /HPF — SIGNIFICANT CHANGE UP (ref 0–4)
SARS-COV-2 RNA SPEC QL NAA+PROBE: DETECTED
SODIUM SERPL-SCNC: 136 MMOL/L — SIGNIFICANT CHANGE UP (ref 135–146)
SP GR SPEC: 1.01 — SIGNIFICANT CHANGE UP (ref 1.01–1.03)
TROPONIN T SERPL-MCNC: <0.01 NG/ML — SIGNIFICANT CHANGE UP
UROBILINOGEN FLD QL: SIGNIFICANT CHANGE UP
WBC # BLD: 3.25 K/UL — LOW (ref 4.8–10.8)
WBC # FLD AUTO: 3.25 K/UL — LOW (ref 4.8–10.8)
WBC UR QL: 11 /HPF — HIGH (ref 0–5)

## 2020-11-17 PROCEDURE — 99285 EMERGENCY DEPT VISIT HI MDM: CPT

## 2020-11-17 PROCEDURE — 71275 CT ANGIOGRAPHY CHEST: CPT | Mod: 26

## 2020-11-17 PROCEDURE — 71045 X-RAY EXAM CHEST 1 VIEW: CPT | Mod: 26

## 2020-11-17 PROCEDURE — 93010 ELECTROCARDIOGRAM REPORT: CPT

## 2020-11-17 PROCEDURE — 74177 CT ABD & PELVIS W/CONTRAST: CPT | Mod: 26

## 2020-11-17 RX ORDER — KETOROLAC TROMETHAMINE 30 MG/ML
15 SYRINGE (ML) INJECTION ONCE
Refills: 0 | Status: DISCONTINUED | OUTPATIENT
Start: 2020-11-17 | End: 2020-11-17

## 2020-11-17 RX ORDER — SODIUM CHLORIDE 9 MG/ML
1000 INJECTION INTRAMUSCULAR; INTRAVENOUS; SUBCUTANEOUS ONCE
Refills: 0 | Status: COMPLETED | OUTPATIENT
Start: 2020-11-17 | End: 2020-11-17

## 2020-11-17 RX ADMIN — SODIUM CHLORIDE 1000 MILLILITER(S): 9 INJECTION INTRAMUSCULAR; INTRAVENOUS; SUBCUTANEOUS at 12:43

## 2020-11-17 RX ADMIN — Medication 15 MILLIGRAM(S): at 13:17

## 2020-11-17 RX ADMIN — SODIUM CHLORIDE 1000 MILLILITER(S): 9 INJECTION INTRAMUSCULAR; INTRAVENOUS; SUBCUTANEOUS at 13:18

## 2020-11-17 RX ADMIN — Medication 15 MILLIGRAM(S): at 12:43

## 2020-11-17 NOTE — ED ADULT NURSE NOTE - OBJECTIVE STATEMENT
Pt presented with c/o generalized weakness, back pain and syncopal episode. Pt states that last night she got up to walk and became diaphoretic, weak and passed out. Pt states she remembers blacking out, but does not know for how long. Pt also endorses thoracic back pain which has been getting worse. Similar pain to last year, had dissection workup which was negative. Denies fevers/n/v/diarrhea/sob/cp. Alert and oriented x3. Ambulating independently and steadily at this time.

## 2020-11-17 NOTE — ED PROVIDER NOTE - ATTENDING CONTRIBUTION TO CARE
40yoF with h/o HTN alone presents with generalized weakness, poor appetite and PO intake, episode of diaphoresis, chills, followed by syncope when she stood up to walk. Pt also reports worsening of what she describes as her usual lower back thoracic pain extending in a band across her back. Denies CP, abdominal pain, SOB, leg pain or swelling, recent long distance travel, OCP use, COVID exposure, vomiting, diarrhea, urinary symptoms, and all other symptoms. Noted visit with fever, CP, and back pain 1 yr ago, CTA dissection negative. On exam, afebrile, hemodynamically stable, saturating well on RA, NAD, well appearing, laying back comfortably in bed, no WOB, speaking full sentences, head NCAT, no thoracic spine TTP/stepoff/deformity or rashes, EOMI grossly, anicteric, MMM, no JVD, RRR, nml S1/S2, no m/r/g, lungs CTAB, no w/r/r, abd soft, NT, ND, nml BS, no rebound or guarding, AAO, CN's 3-12 grossly intact, BAUTISTA spontaneously, no leg cyanosis or edema, skin warm, well perfused, no rashes or hives. Character low suspicion for PE and PERC negative. Character low suspicion for dissection and no murmur or pulse asymmetry. Character low suspicion for ACS and ECG/trop unremarkable _________. No arrhythmia or e/o aortic outflow obstruction. No coughing or e/o PNA on exam or CXR. No e/o fluid overload. Abdomen benign with low suspicion for acute process. No anemia or bleeding _______ or significant gross electrolyte abnormality. No e/o cellulitis, meningitis, endocarditis. UA ________ and character/exam low suspicion for pyelo. Character c/w a viral process. Given fluids, Toradol with _____________. 40yoF with h/o HTN alone presents with generalized weakness, poor appetite and PO intake, episode of diaphoresis, chills, followed by syncope when she stood up to walk. Pt also reports worsening of what she describes as her usual lower back thoracic pain extending in a band across her back. Denies CP, abdominal pain, SOB, leg pain or swelling, recent long distance travel, OCP use, COVID exposure, vomiting, diarrhea, urinary symptoms, and all other symptoms. Noted visit with fever, CP, and back pain 1 yr ago, CTA dissection negative. On exam, afebrile, hemodynamically stable, saturating well on RA, NAD, well appearing, laying back comfortably in bed, no WOB, speaking full sentences, head NCAT, no thoracic spine TTP/stepoff/deformity or rashes, EOMI grossly, anicteric, MMM, no JVD, RRR, nml S1/S2, no m/r/g, lungs CTAB, no w/r/r, abd soft, NT, ND, nml BS, no rebound or guarding, AAO, CN's 3-12 grossly intact, BAUTISTA spontaneously, no leg cyanosis or edema, skin warm, well perfused, no rashes or hives. Character low suspicion for PE and PERC negative. Character low suspicion for dissection and no murmur or pulse asymmetry. Character low suspicion for ACS and ECG/trop unremarkable. No arrhythmia or e/o aortic outflow obstruction. No coughing or e/o PNA on exam or CXR. No e/o fluid overload. Abdomen benign with low suspicion for acute process. No anemia or bleeding or significant gross electrolyte abnormality. No e/o cellulitis, meningitis, endocarditis. UA low suspicion for UTI and character/exam low suspicion for pyelo. Character c/w a viral process - CT shows multifocal opacities and have high suspicion for COVID. Given fluids, Toradol with significant improvement. Patient is well appearing, NAD, afebrile, hemodynamically stable, no desats or WOB. Any available tests and studies were discussed with patient. Discharged with rx abx, instructions in further symptomatic care and need for quarantining, return precautions, and need for PMD f/u.

## 2020-11-17 NOTE — ED PROVIDER NOTE - NS ED ROS FT
Constitutional: (+) fatigue, (-) fever  Eyes/ENT: (-) blurry vision, (-) epistaxis  Cardiovascular: (+) syncope, (-) chest pain  Respiratory: (-) cough, (-) shortness of breath  Gastrointestinal: (-) vomiting, (-) diarrhea  : (-) dysuria, (-) hematuria  Musculoskeletal: (+) back pain, (-) neck pain, (-) joint pain  Integumentary: (-) rash, (-) edema  Neurological: (-) headache, (-) altered mental status  Allergic/Immunologic: (-) pruritus

## 2020-11-17 NOTE — ED PROVIDER NOTE - OBJECTIVE STATEMENT
40y F pmh htn presents for eval of syncope. Pt states she has generalized fatigue x3 days with an episode of syncope yesterday lasting <10sec, spontaneously resolved, no aggravating factors. Pt endorses mild aching R superior back pain. Denies fever, ha, cp, sob, cough, numbness, dysuria, hematuria

## 2020-11-17 NOTE — ED ADULT TRIAGE NOTE - CHIEF COMPLAINT QUOTE
Syncopal episode last night. Pt reports feeling really weak, becoming diaphoretic and passing out. Pt reports back pain.

## 2020-11-17 NOTE — ED PROVIDER NOTE - PATIENT PORTAL LINK FT
You can access the FollowMyHealth Patient Portal offered by [facility] by registering at the following website: [email or web address]. By joining [facility]’s FollowMyHealth portal, you will also be able to view your health information using other applications (apps) compatible with our system.

## 2020-11-17 NOTE — ED PROVIDER NOTE - CLINICAL SUMMARY MEDICAL DECISION MAKING FREE TEXT BOX
40yoF with h/o HTN alone presents with generalized weakness, poor appetite and PO intake, episode of diaphoresis, chills, followed by syncope when she stood up to walk. Pt also reports worsening of what she describes as her usual lower back thoracic pain extending in a band across her back. Denies CP, abdominal pain, SOB, leg pain or swelling, recent long distance travel, OCP use, COVID exposure, vomiting, diarrhea, urinary symptoms, and all other symptoms. Noted visit with fever, CP, and back pain 1 yr ago, CTA dissection negative. On exam, afebrile, hemodynamically stable, saturating well on RA, NAD, well appearing, laying back comfortably in bed, no WOB, speaking full sentences, head NCAT, no thoracic spine TTP/stepoff/deformity or rashes, EOMI grossly, anicteric, MMM, no JVD, RRR, nml S1/S2, no m/r/g, lungs CTAB, no w/r/r, abd soft, NT, ND, nml BS, no rebound or guarding, AAO, CN's 3-12 grossly intact, BAUTISTA spontaneously, no leg cyanosis or edema, skin warm, well perfused, no rashes or hives. Character low suspicion for PE and PERC negative. Character low suspicion for dissection and no murmur or pulse asymmetry. Character low suspicion for ACS and ECG/trop unremarkable. No arrhythmia or e/o aortic outflow obstruction. No coughing or e/o PNA on exam or CXR. No e/o fluid overload. Abdomen benign with low suspicion for acute process. No anemia or bleeding or significant gross electrolyte abnormality. No e/o cellulitis, meningitis, endocarditis. UA low suspicion for UTI and character/exam low suspicion for pyelo. Character c/w a viral process - CT shows multifocal opacities and have high suspicion for COVID. Given fluids, Toradol with significant improvement. Patient is well appearing, NAD, afebrile, hemodynamically stable, no desats or WOB. Any available tests and studies were discussed with patient. Discharged with rx abx, instructions in further symptomatic care and need for quarantining, return precautions, and need for PMD f/u.

## 2020-11-17 NOTE — ED PROVIDER NOTE - CARE PLAN
Principal Discharge DX:	Pneumonia  Secondary Diagnosis:	Suspected COVID-19 virus infection  Secondary Diagnosis:	Syncope

## 2020-11-17 NOTE — ED PROVIDER NOTE - NSFOLLOWUPINSTRUCTIONS_ED_ALL_ED_FT
Follow up with PMD in 1-2 days.    Pneumonia    Pneumonia is an infection of the lungs. Pneumonia may be caused by bacteria, viruses, or funguses. Symptoms include coughing, fever, chest pain when breathing deeply or coughing, shortness of breath, fatigue, or muscle aches. Pneumonia can be diagnosed with a medical history and physical exam, as well as other tests which may include a chest X-ray. If you were prescribed an antibiotic medicine, take it as told by your health care provider and do not stop taking the antibiotic even if you start to feel better. Do not use tobacco products, including cigarettes, chewing tobacco, and e-cigarettes.    SEEK IMMEDIATE MEDICAL CARE IF YOU HAVE ANY OF THE FOLLOWING SYMPTOMS: worsening shortness of breath, worsening chest pain, coughing up blood, change in mental status, lightheadedness/dizziness.    Novel Coronavirus (COVID-19)  The Facts  What is a coronavirus?  Coronaviruses are a large family of viruses that cause illnesses ranging from the common cold  to more severe diseases such as Middle East Respiratory Syndrome (MERS) and Severe Acute  Respiratory Syndrome (SARS).  What is Novel Coronavirus (COVID-19)?  COVID-19 is a new strain of Coronavirus that has not been previously identified in humans. COVID-19  was identified in [facility] [place] in December 2019 (COVID-19). COVID-19 has  since been identified outside of [place], in a growing number of countries internationally, including  the [place].  Where can I find the most recent information about COVID-19?  The Centers for Disease Control and Prevention (CDC) is closely monitoring the outbreak caused by the  COVID-19. For the latest information about COVID- 19, visit the CDC website at  https://www.cdc.gov/coronavirus/index.html  How are coronaviruses spread?  Coronaviruses can be transmitted from person-to- person, usually after close contact with an infected person,  for example, in a household, workplace, or healthcare setting via droplets that become airborne after a cough  or sneeze by an affected person. These droplets can then infect a nearby person. It is likely transmission also  occurs by touching recently contaminated surfaces.  What are the symptoms of coronavirus infection?  It depends on the virus, but common signs include fever and/or respiratory symptoms such as  cough and shortness of breath. In more severe cases, infection can cause pneumonia, severe acute  respiratory syndrome, kidney failure and even death. Fortunately, most cases of COVID-19 have an  illness no different than the influenza “flu”. With a majority of these patients having mild symptoms  and overall mortality which appears to be not much different than the flu.  Is there a treatment for a COVID-19?  There is no specific treatment for disease caused by COVID-19. However, many of the symptoms can  be treated based on the patient’s clinical condition. Supportive care for infected persons can be highly  effective.  What can I do to protect myself?  Washing your hands, covering your cough, and disinfecting surfaces are the best precautionary  measures. It is also advisable to avoid close contact with anyone showing symptoms of respiratory  illness such as coughing and sneezing. Those with symptoms should wear a surgical mask when  around others.  What can I do to protect those around me?  If you have been identified as someone who may be infected with COVID-19, we recommend you  follow the self-isolation procedures outlined below to protect those around you and limit the spread  of this virus.   March 3, 2020  Recommendations for Patients Advised to Self-Isolate  for Possible COVID-19 Exposure  We recommend the below precautionary steps from now until 14 days from when you  returned from your travel or date of your last known possible contact:  - Do not go to work, school, or public areas. Avoid using public transportation, ride-sharing, or  taxis.  - As much as possible, separate yourself from other people in your home. If you can, you should  stay in a room and away from other people in your home. Also, you should use a separate  bathroom, if available.  - Wear the supplied mask whenever you are around other people.  - If you have a non-urgent medical appointment, please reschedule for a later date. If the  appointment is urgent, please call the healthcare provider and tell them that you are on selfisolation for possible COVID-19. This will help the healthcare provider’s office take steps to keep  other people from getting infected or exposed. If you can reschedule routine appointments, do  so.  - Wash your hands often with soap and water for at least 15 to 20 seconds or clean your hands  with an alcohol-based hand  that contains 60 to 95% alcohol, covering all surfaces of  your hands and rubbing them together until they feel dry. Soap and water should be used  preferentially if hands are visibly dirty.  - Cover your mouth and nose with a tissue when you cough or sneeze. Throw used tissues in a  lined trash can; immediately wash your hands.  - Avoid touching your eyes, nose, and mouth with your hands.  - Avoid sharing personal household items. You should not share dishes, drinking glasses, cups,  eating utensils, towels, or bedding with other people or pets in your home. After using these  items, they should be washed thoroughly with soap and water.  - Clean and disinfect all “high-touch” surfaces every day. High touch surfaces include counters,  tabletops, doorknobs, light switches, remote controls, bathroom fixtures, toilets, phones,  keyboards, tablets, and bedside tables. Also, clean any surfaces that may have blood, stool, or  body fluids on them.       If you develop worsening symptoms:  - If you develop worsening symptoms, such as severe shortness of breath, please call [phone] option #[phone]. They will assist you in determining your next steps.  During your time on self-isolation do the following:  - Work from home if you are able to so.  - Limit social isolation by talking with friends and family on the phone or with face-time  - Talk with friends and relatives who don’t live with you about supporting each other if one  household has to be quarantined. For example, agree to drop groceries or other supplies at the  front door.  - Exercise and spend time outdoors away from others if able to do so.    Why didn’t I get tested for novel coronavirus (COVID-19)?  The number of available tests is very limited so strict rules exist for who is allowed to be tested.  [facility] has been authorized to perform testing and is currently working hard to be  able to start providing the test. Such testing is currently reserved for patients who have had  contact with someone infected with the virus, or those who are very sick a plus those who have  traveled to areas identified by the Centers for Disease Control and Prevention (CD) and will  require hospitalization.  What should I do now?  If you are well enough to be discharged home and are not in a high risk group to have  contracted the COVID-10, you should care for yourself at home exactly like you would if you  have Influenza “flu”. Follow all the standard guidelines about washing your hands, covering  your cough, etc.  You should return to the Emergency Department if you develop worse symptoms, trouble  breathing, chest pain, and/or a fever that doesn’t improve with over the counter  acetaminophen or ibuprofen.

## 2020-12-21 ENCOUNTER — EMERGENCY (EMERGENCY)
Facility: HOSPITAL | Age: 40
LOS: 0 days | Discharge: HOME | End: 2020-12-21
Attending: EMERGENCY MEDICINE | Admitting: EMERGENCY MEDICINE
Payer: COMMERCIAL

## 2020-12-21 VITALS
RESPIRATION RATE: 20 BRPM | DIASTOLIC BLOOD PRESSURE: 83 MMHG | SYSTOLIC BLOOD PRESSURE: 158 MMHG | TEMPERATURE: 98 F | WEIGHT: 222.01 LBS | HEART RATE: 83 BPM | HEIGHT: 65 IN | OXYGEN SATURATION: 96 %

## 2020-12-21 DIAGNOSIS — I10 ESSENTIAL (PRIMARY) HYPERTENSION: ICD-10-CM

## 2020-12-21 DIAGNOSIS — Z79.899 OTHER LONG TERM (CURRENT) DRUG THERAPY: ICD-10-CM

## 2020-12-21 DIAGNOSIS — Z88.0 ALLERGY STATUS TO PENICILLIN: ICD-10-CM

## 2020-12-21 DIAGNOSIS — Z79.51 LONG TERM (CURRENT) USE OF INHALED STEROIDS: ICD-10-CM

## 2020-12-21 DIAGNOSIS — Z79.2 LONG TERM (CURRENT) USE OF ANTIBIOTICS: ICD-10-CM

## 2020-12-21 PROCEDURE — 99284 EMERGENCY DEPT VISIT MOD MDM: CPT

## 2020-12-21 NOTE — ED PROVIDER NOTE - NSFOLLOWUPINSTRUCTIONS_ED_ALL_ED_FT
Follow up with your primary medical doctor in 1-2 days     Hypertension  Hypertension, commonly called high blood pressure, is when the force of blood pumping through the arteries is too strong. The arteries are the blood vessels that carry blood from the heart throughout the body. Hypertension forces the heart to work harder to pump blood and may cause arteries to become narrow or stiff. Having untreated or uncontrolled hypertension can cause heart attacks, strokes, kidney disease, and other problems.    A blood pressure reading consists of a higher number over a lower number. Ideally, your blood pressure should be below 120/80. The first ("top") number is called the systolic pressure. It is a measure of the pressure in your arteries as your heart beats. The second ("bottom") number is called the diastolic pressure. It is a measure of the pressure in your arteries as the heart relaxes.    What are the causes?  The cause of this condition is not known.    What increases the risk?  Some risk factors for high blood pressure are under your control. Others are not.    Factors you can change     Smoking.  Having type 2 diabetes mellitus, high cholesterol, or both.  Not getting enough exercise or physical activity.  Being overweight.  Having too much fat, sugar, calories, or salt (sodium) in your diet.  Drinking too much alcohol.  Factors that are difficult or impossible to change     Having chronic kidney disease.  Having a family history of high blood pressure.  Age. Risk increases with age.  Race. You may be at higher risk if you are -American.  Gender. Men are at higher risk than women before age 45. After age 65, women are at higher risk than men.  Having obstructive sleep apnea.  Stress.  What are the signs or symptoms?  Extremely high blood pressure (hypertensive crisis) may cause:    Headache.  Anxiety.  Shortness of breath.  Nosebleed.  Nausea and vomiting.  Severe chest pain.  Jerky movements you cannot control (seizures).    How is this diagnosed?  This condition is diagnosed by measuring your blood pressure while you are seated, with your arm resting on a surface. The cuff of the blood pressure monitor will be placed directly against the skin of your upper arm at the level of your heart. It should be measured at least twice using the same arm. Certain conditions can cause a difference in blood pressure between your right and left arms.    Certain factors can cause blood pressure readings to be lower or higher than normal (elevated) for a short period of time:    When your blood pressure is higher when you are in a health care provider's office than when you are at home, this is called white coat hypertension. Most people with this condition do not need medicines.  When your blood pressure is higher at home than when you are in a health care provider's office, this is called masked hypertension. Most people with this condition may need medicines to control blood pressure.    If you have a high blood pressure reading during one visit or you have normal blood pressure with other risk factors:    You may be asked to return on a different day to have your blood pressure checked again.  You may be asked to monitor your blood pressure at home for 1 week or longer.    If you are diagnosed with hypertension, you may have other blood or imaging tests to help your health care provider understand your overall risk for other conditions.    How is this treated?  This condition is treated by making healthy lifestyle changes, such as eating healthy foods, exercising more, and reducing your alcohol intake. Your health care provider may prescribe medicine if lifestyle changes are not enough to get your blood pressure under control, and if:    Your systolic blood pressure is above 130.  Your diastolic blood pressure is above 80.    Your personal target blood pressure may vary depending on your medical conditions, your age, and other factors.    Follow these instructions at home:  Eating and drinking     Eat a diet that is high in fiber and potassium, and low in sodium, added sugar, and fat. An example eating plan is called the DASH (Dietary Approaches to Stop Hypertension) diet. To eat this way:    Eat plenty of fresh fruits and vegetables. Try to fill half of your plate at each meal with fruits and vegetables.  Eat whole grains, such as whole wheat pasta, brown rice, or whole grain bread. Fill about one quarter of your plate with whole grains.  Eat or drink low-fat dairy products, such as skim milk or low-fat yogurt.  Avoid fatty cuts of meat, processed or cured meats, and poultry with skin. Fill about one quarter of your plate with lean proteins, such as fish, chicken without skin, beans, eggs, and tofu.  Avoid premade and processed foods. These tend to be higher in sodium, added sugar, and fat.    Reduce your daily sodium intake. Most people with hypertension should eat less than 1,500 mg of sodium a day.  ImageLimit alcohol intake to no more than 1 drink a day for nonpregnant women and 2 drinks a day for men. One drink equals 12 oz of beer, 5 oz of wine, or 1½ oz of hard liquor.  Lifestyle     Work with your health care provider to maintain a healthy body weight or to lose weight. Ask what an ideal weight is for you.  Get at least 30 minutes of exercise that causes your heart to beat faster (aerobic exercise) most days of the week. Activities may include walking, swimming, or biking.  Include exercise to strengthen your muscles (resistance exercise), such as pilates or lifting weights, as part of your weekly exercise routine. Try to do these types of exercises for 30 minutes at least 3 days a week.  Do not use any products that contain nicotine or tobacco, such as cigarettes and e-cigarettes. If you need help quitting, ask your health care provider.  Monitor your blood pressure at home as told by your health care provider.  Keep all follow-up visits as told by your health care provider. This is important.  Medicines     Take over-the-counter and prescription medicines only as told by your health care provider. Follow directions carefully. Blood pressure medicines must be taken as prescribed.  Do not skip doses of blood pressure medicine. Doing this puts you at risk for problems and can make the medicine less effective.  Ask your health care provider about side effects or reactions to medicines that you should watch for.  Contact a health care provider if:  You think you are having a reaction to a medicine you are taking.  You have headaches that keep coming back (recurring).  You feel dizzy.  You have swelling in your ankles.  You have trouble with your vision.  Get help right away if:  You develop a severe headache or confusion.  You have unusual weakness or numbness.  You feel faint.  You have severe pain in your chest or abdomen.  You vomit repeatedly.  You have trouble breathing.  Summary  Hypertension is when the force of blood pumping through your arteries is too strong. If this condition is not controlled, it may put you at risk for serious complications.  Your personal target blood pressure may vary depending on your medical conditions, your age, and other factors. For most people, a normal blood pressure is less than 120/80.  Hypertension is treated with lifestyle changes, medicines, or a combination of both. Lifestyle changes include weight loss, eating a healthy, low-sodium diet, exercising more, and limiting alcohol.  This information is not intended to replace advice given to you by your health care provider. Make sure you discuss any questions you have with your health care provider.

## 2020-12-21 NOTE — ED PROVIDER NOTE - ATTENDING CONTRIBUTION TO CARE
40F h/o htn p/w high BP. Pt rpts recently poorly controlled HTN, has seen PMD Dr. Bartholomew for same 2x over last month, meds were changed to lisinopril/hctz 100/12.5 mg qd & metoprolol ER 25 mg qd ~1 week ago. Pt asymptomatic, states she chks her BP several times per day chkd this evening and was 190/100, took metoprolol 50 mg and came to ED. 158/83 in ED. Denies ha, vision changes, cp/sob, nv, abd pain, edema, focal numbness or weakness.    PE:  nad  skin warm, dry  ncat  neck supple  rrr nl s1s2 no mrg  ctab no wrr  abd soft ntnd no palpable masses no rgr  back non-tender no cvat  ext no cce dpi  neuro aaox3, cn 2-12 intact bl no nystagmus or facial droop, 5/5 strength x 4 no drift, gross sensation intact, finger-nose nl, gait nl, romberg neg

## 2020-12-21 NOTE — ED PROVIDER NOTE - CLINICAL SUMMARY MEDICAL DECISION MAKING FREE TEXT BOX
asymptomatic HTN, meds changed by PMD ~1 week ago - 158/83 in ED, neuro exam nf - BP monitoring reviewed with pt, strict return precautions discussed, pt advised to call PMD best for urgent f/u for continued BP medication mgmt

## 2020-12-21 NOTE — ED ADULT NURSE NOTE - NSIMPLEMENTINTERV_GEN_ALL_ED
Implemented All Universal Safety Interventions:  [place] to call system. Call bell, personal items and telephone within reach. Instruct patient to call for assistance. Room bathroom lighting operational. Non-slip footwear when patient is off stretcher. Physically safe environment: no spills, clutter or unnecessary equipment. Stretcher in lowest position, wheels locked, appropriate side rails in place.

## 2020-12-21 NOTE — ED PROVIDER NOTE - CARE PROVIDER_API CALL
Yesica Bartholomew)  Internal Medicine  [address] [place] [id number]  Phone: [phone]  Fax: [phone]  Established Patient  Follow Up Time: Urgent

## 2020-12-21 NOTE — ED PROVIDER NOTE - OBJECTIVE STATEMENT
Pt is a 40 year old female with PMH HTN presents to ED with complaints of High Blood Pressure. Pt states recently changed BP regimen medication to lisinopril and metoprolol. Pt states tonight noted BP to be elevated tonight 196/102. Pt states took (2) metoprolol 25mg prior to coming into ED tonight. Pt denies HA, blurry vision, dizziness, syncope, chest pain, sob, abdominal pain, NVCD

## 2020-12-21 NOTE — ED ADULT TRIAGE NOTE - CHIEF COMPLAINT QUOTE
pt came to ED stating her blood pressure was high this evening. states her BP was 190/101. pt states she checks her BP because she takes hypertension medication, and "felt her BP going up today".   pt states she took 50mg metoprolol PTA.

## 2020-12-21 NOTE — ED PROVIDER NOTE - PATIENT PORTAL LINK FT
You can access the FollowMyHealth Patient Portal offered by [facility] by registering at the following website: [email or web address]. By joining [employer]’s FollowMyHealth portal, you will also be able to view your health information using other applications (apps) compatible with our system.

## 2020-12-21 NOTE — ED ADULT NURSE NOTE - OBJECTIVE STATEMENT
patient complaining of high blood pressure at home 190/101. patient took metoprolol 50mg prior to ED visit. patient's usual metoprolol dose is 25mg.

## 2020-12-24 ENCOUNTER — APPOINTMENT (OUTPATIENT)
Dept: HEART AND VASCULAR | Facility: CLINIC | Age: 40
End: 2020-12-24
Payer: COMMERCIAL

## 2020-12-24 ENCOUNTER — NON-APPOINTMENT (OUTPATIENT)
Age: 40
End: 2020-12-24

## 2020-12-24 VITALS
DIASTOLIC BLOOD PRESSURE: 90 MMHG | HEART RATE: 80 BPM | RESPIRATION RATE: 12 BRPM | BODY MASS INDEX: 36.96 KG/M2 | WEIGHT: 230 LBS | SYSTOLIC BLOOD PRESSURE: 150 MMHG | HEIGHT: 66 IN

## 2020-12-24 DIAGNOSIS — R06.02 SHORTNESS OF BREATH: ICD-10-CM

## 2020-12-24 DIAGNOSIS — Z78.9 OTHER SPECIFIED HEALTH STATUS: ICD-10-CM

## 2020-12-24 DIAGNOSIS — Z86.79 PERSONAL HISTORY OF OTHER DISEASES OF THE CIRCULATORY SYSTEM: ICD-10-CM

## 2020-12-24 PROCEDURE — 99072 ADDL SUPL MATRL&STAF TM PHE: CPT

## 2020-12-24 PROCEDURE — 99204 OFFICE O/P NEW MOD 45 MIN: CPT | Mod: 25

## 2020-12-24 PROCEDURE — 93306 TTE W/DOPPLER COMPLETE: CPT

## 2020-12-24 PROCEDURE — 93000 ELECTROCARDIOGRAM COMPLETE: CPT

## 2020-12-24 NOTE — REASON FOR VISIT
[Initial Evaluation] : an initial evaluation of [Chest Pain] : chest pain [Dyspnea] : dyspnea [Hypertension] : hypertension

## 2020-12-24 NOTE — DISCUSSION/SUMMARY
[FreeTextEntry1] : 1. Chest pain: Will need to rule out ischemic heart disease in this middle-aged female with new-onset chest pain and shortness of breath and exertional activity will advise results of an ETT are known.\par 2. Shortness of breath: Echocardiogram\par 3. Hypertension: Will discontinue losartan hydrochlorothiazide and Toprol were replaced it with olmesartan hydrochlorothiazide. AND add cardvedilol\par

## 2020-12-24 NOTE — HISTORY OF PRESENT ILLNESS
[FreeTextEntry1] : 40-year-old female with a past medical history of hypertension comes in for evaluation of chest pain and shortness of breath. Patient reports a few months of left-sided nonradiating chest discomfort that is worsened with exertion with activity better with rest associated with shortness of breath. Patient recently diagnosed with hypertension start on medication with very little effect. Currently asymptomatic.

## 2020-12-24 NOTE — PHYSICAL EXAM
[Normal Oral Mucosa] : normal oral mucosa [No Oral Pallor] : no oral pallor [No Oral Cyanosis] : no oral cyanosis [Normal Jugular Venous A Waves Present] : normal jugular venous A waves present [Normal Jugular Venous V Waves Present] : normal jugular venous V waves present [No Jugular Venous Vargas A Waves] : no jugular venous vargas A waves [5th Left ICS - MCL] : palpated at the 5th LICS in the midclavicular line [Normal] : normal [Normal Rate] : normal [Rhythm Regular] : regular [Normal S1] : normal S1 [Normal S2] : normal S2 [III] : a grade 3 [No Pitting Edema] : no pitting edema present [Respiration, Rhythm And Depth] : normal respiratory rhythm and effort [Exaggerated Use Of Accessory Muscles For Inspiration] : no accessory muscle use [Auscultation Breath Sounds / Voice Sounds] : lungs were clear to auscultation bilaterally [Abdomen Soft] : soft [Abdomen Tenderness] : non-tender [Abdomen Mass (___ Cm)] : no abdominal mass palpated [Abnormal Walk] : normal gait [Gait - Sufficient For Exercise Testing] : the gait was sufficient for exercise testing [Nail Clubbing] : no clubbing of the fingernails [Cyanosis, Localized] : no localized cyanosis [Petechial Hemorrhages (___cm)] : no petechial hemorrhages [Skin Color & Pigmentation] : normal skin color and pigmentation [] : no rash [No Venous Stasis] : no venous stasis [Skin Lesions] : no skin lesions [No Skin Ulcers] : no skin ulcer [No Xanthoma] : no  xanthoma was observed [Right Carotid Bruit] : no bruit heard over the right carotid [Left Carotid Bruit] : no bruit heard over the left carotid

## 2021-01-11 ENCOUNTER — APPOINTMENT (OUTPATIENT)
Dept: HEART AND VASCULAR | Facility: CLINIC | Age: 41
End: 2021-01-11

## 2021-02-24 RX ORDER — CARVEDILOL 3.12 MG/1
3.12 TABLET, FILM COATED ORAL
Qty: 180 | Refills: 1 | Status: ACTIVE | COMMUNITY
Start: 2020-12-24 | End: 1900-01-01

## 2021-04-20 ENCOUNTER — APPOINTMENT (OUTPATIENT)
Dept: HEART AND VASCULAR | Facility: CLINIC | Age: 41
End: 2021-04-20
Payer: COMMERCIAL

## 2021-04-20 VITALS
BODY MASS INDEX: 36.96 KG/M2 | HEART RATE: 96 BPM | WEIGHT: 230 LBS | HEIGHT: 66 IN | DIASTOLIC BLOOD PRESSURE: 80 MMHG | SYSTOLIC BLOOD PRESSURE: 150 MMHG

## 2021-04-20 DIAGNOSIS — R07.89 OTHER CHEST PAIN: ICD-10-CM

## 2021-04-20 DIAGNOSIS — I10 ESSENTIAL (PRIMARY) HYPERTENSION: ICD-10-CM

## 2021-04-20 PROCEDURE — 99072 ADDL SUPL MATRL&STAF TM PHE: CPT

## 2021-04-20 PROCEDURE — 93015 CV STRESS TEST SUPVJ I&R: CPT

## 2021-04-20 NOTE — PHYSICAL EXAM
[General Appearance - Well Developed] : well developed [Normal Appearance] : normal appearance [Well Groomed] : well groomed [General Appearance - Well Nourished] : well nourished [General Appearance - In No Acute Distress] : no acute distress [No Deformities] : no deformities [Normal Conjunctiva] : the conjunctiva exhibited no abnormalities [Eyelids - No Xanthelasma] : the eyelids demonstrated no xanthelasmas [Normal Oral Mucosa] : normal oral mucosa [No Oral Pallor] : no oral pallor [No Oral Cyanosis] : no oral cyanosis [Normal Jugular Venous A Waves Present] : normal jugular venous A waves present [Normal Jugular Venous V Waves Present] : normal jugular venous V waves present [No Jugular Venous Vargas A Waves] : no jugular venous vargas A waves [] : no respiratory distress [Respiration, Rhythm And Depth] : normal respiratory rhythm and effort [Exaggerated Use Of Accessory Muscles For Inspiration] : no accessory muscle use [Auscultation Breath Sounds / Voice Sounds] : lungs were clear to auscultation bilaterally [Heart Rate And Rhythm] : heart rate and rhythm were normal [Heart Sounds] : normal S1 and S2 [Murmurs] : no murmurs present

## 2021-05-12 NOTE — ED PROVIDER NOTE - MDM PATIENT STATEMENT FOR ADDL TREATMENT
Primary Nurse Jayden Rabago RN and José Sanders RN performed a dual skin assessment on this patient. Abrasions noted to upper and lower bilateral extremities. Black scabbed pressure injury noted to right heel. Dressing change performed. Open pressure injury noted to right buttock. Dressing change performed. Patient with one or more new problems requiring additional work-up/treatment.

## 2021-06-04 NOTE — ED ADULT NURSE NOTE - PAIN: BODY LOCATION
Progress Note - OB/GYN   Ryan Wilhelm 31 y o  female MRN: [id number]  Unit/Bed#: -01 Encounter: [id number]    Assessment:  31 y o  [id number] s/p Repeat low transverse  section post-operative day 1  Pregnancy uncomplicated  Patient recovering well, Stable    Plan:  1  Postpartum  Continue routine post partum care  Pain management PRN  Encourage ambulation  Encourage breastfeeding   Patient start Lovenox today  Patient desires a 6 week postpartum IUD, considering bridge    2    Hemoglobin 11 2 --> 8 9  Urine output 400 cc/2 hour/88 kg --> 2 72 CC/kg/H  Void trial today    3   Discharge  Anticipate d/c tomorrow      Subjective/Objective   Chief Complaint:    Postpartum state    Subjective:   Pain: yes, cramping, improved with meds  Tolerating PO: yes  Voiding: yes  Flatus: yes  BM: no  Ambulating: yes  Breastfeeding:  yes  Chest pain: no  Shortness of breath: no  Leg pain: no  Lochia: minimal    Objective:     Vitals: Temp:  [97 9 °F (36 6 °C)-99 °F (37 2 °C)] 97 9 °F (36 6 °C)  HR:  [60-92] 80  Resp:  [16-19] 18  BP: ()/(50-67) 99/56       Intake/Output Summary (Last 24 hours) at 2021 0854  Last data filed at 2021 0750  Gross per 24 hour   Intake 4800 ml   Output 2784 ml   Net 2016 ml         Physical Exam:   General: NAD, alert, oriented  Cardio: Regular rate and rhythm, no murmur  Resp: nonlabored breathing, clear to auscultation bilaterally  Abdomen: Soft, no distension/rebound/guarding/tenderness   Fundus: Firm, non-tender, fundus: at umbilicus  Incision: C/D/I  G/U:  Minimal lochia noted on pad  Lower Extremities: Non-tender, no palpable cords    Medications:  Current Facility-Administered Medications   Medication Dose Route Frequency    acetaminophen (TYLENOL) tablet 650 mg  650 mg Oral Q6H    acetaminophen (TYLENOL) tablet 650 mg  650 mg Oral Q4H PRN    benzocaine-menthol-lanolin-aloe (DERMOPLAST) 20-0 5 % topical spray 1 application  1 application Topical 4x Daily PRN    calcium carbonate (TUMS) chewable tablet 1,000 mg  1,000 mg Oral Daily PRN    dexamethasone (DECADRON) injection 8 mg  8 mg Intravenous Once PRN    diphenhydrAMINE (BENADRYL) tablet 25 mg  25 mg Oral Q6H PRN    docusate sodium (COLACE) capsule 100 mg  100 mg Oral BID    enoxaparin (LOVENOX) subcutaneous injection 40 mg  40 mg Subcutaneous Q24H GIO    fentaNYL (SUBLIMAZE) injection 25 mcg  25 mcg Intravenous Q3 min PRN    hydrocortisone 1 % cream 1 application  1 application Topical Daily PRN    HYDROmorphone (DILAUDID) injection 0 4 mg  0 4 mg Intravenous Q5 Min PRN    HYDROmorphone (DILAUDID) injection 0 5 mg  0 5 mg Intravenous Q2H PRN    ibuprofen (MOTRIN) tablet 600 mg  600 mg Oral Q4H PRN    ketorolac (TORADOL) injection 15 mg  15 mg Intravenous Q6H    lactated ringers infusion  125 mL/hr Intravenous Continuous    lactated ringers infusion  75 mL/hr Intravenous Continuous    magnesium hydroxide (MILK OF MAGNESIA) oral suspension 30 mL  30 mL Oral Daily PRN    metoclopramide (REGLAN) injection 5 mg  5 mg Intravenous Q6H PRN    nalbuphine (NUBAIN) injection 5 mg  5 mg Intravenous Q3H PRN    naloxone (NARCAN) injection 0 1 mg  0 1 mg Intravenous Q3 min PRN    ondansetron (ZOFRAN) injection 4 mg  4 mg Intravenous Q4H PRN    ondansetron (ZOFRAN) injection 4 mg  4 mg Intravenous Q8H PRN    oxyCODONE-acetaminophen (PERCOCET) 5-325 mg per tablet 1 tablet  1 tablet Oral Q4H PRN    oxyCODONE-acetaminophen (PERCOCET) 5-325 mg per tablet 2 tablet  2 tablet Oral Q4H PRN    simethicone (MYLICON) chewable tablet 80 mg  80 mg Oral 4x Daily PRN    witch hazel-glycerin (TUCKS) topical pad 1 pad  1 pad Topical Q4H PRN       Labs:   Recent Results (from the past 24 hour(s))   Type and screen and continue to monitor patient    Collection Time: 06/03/21  9:14 AM   Result Value Ref Range    ABO Grouping B     Rh Factor Positive     Antibody Screen Negative     Specimen Expiration Date [id number]    CBC and differential    Collection Time: 06/03/21  9:14 AM   Result Value Ref Range    WBC 9 38 4 31 - 10 16 Thousand/uL    RBC 4 04 3 81 - 5 12 Million/uL    Hemoglobin 11 2 (L) 11 5 - 15 4 g/dL    Hematocrit 33 9 (L) 34 8 - 46 1 %    MCV 84 82 - 98 fL    MCH 27 7 26 8 - 34 3 pg    MCHC 33 0 31 4 - 37 4 g/dL    RDW 12 8 11 6 - 15 1 %    MPV 13 0 (H) 8 9 - 12 7 fL    Platelets 972 722 - 649 Thousands/uL    nRBC 0 /100 WBCs    Neutrophils Relative 74 43 - 75 %    Immat GRANS % 2 0 - 2 %    Lymphocytes Relative 14 14 - 44 %    Monocytes Relative 8 4 - 12 %    Eosinophils Relative 2 0 - 6 %    Basophils Relative 0 0 - 1 %    Neutrophils Absolute 6 93 1 85 - 7 62 Thousands/µL    Immature Grans Absolute 0 16 0 00 - 0 20 Thousand/uL    Lymphocytes Absolute 1 33 0 60 - 4 47 Thousands/µL    Monocytes Absolute 0 77 0 17 - 1 22 Thousand/µL    Eosinophils Absolute 0 16 0 00 - 0 61 Thousand/µL    Basophils Absolute 0 03 0 00 - 0 10 Thousands/µL   RPR    Collection Time: 06/03/21  9:14 AM   Result Value Ref Range    RPR Non-Reactive Non-Reactive   Green Top 4 ml on hold    Collection Time: 06/03/21  9:14 AM   Result Value Ref Range    Extra Tube Hold for add-ons      Blood gas, venous, cord    Collection Time: 06/03/21 11:26 AM   Result Value Ref Range    pH, Cord [place] 7 347 7 190 - 7 490    pCO2, Cord [place] 43 1 (H) 27 0 - 43 0 mm HG    pO2, Cord [place] 29 4 15 0 - 45 0 mm HG    HCO3, Cord [place] 23 1 12 2 - 28 6 mmol/L    Base Exc, Cord [place] -2 6 (L) 1 0 - 9 0 mmol/L    O2 Cont, Cord [place] 12 9 mL/dL    O2 HGB,VENOUS CORD 64 6 %   Blood gas, arterial, cord    Collection Time: 06/03/21 11:26 AM   Result Value Ref Range    pH, Cord Art 7 338 7 230 - 7 430    pCO2, Cord Art 46 3 30 0 - 60 0    pO2, Cord Art 21 8 5 0 - 25 0 mm HG    HCO3, Cord Art 24 3 17 3 - 27 3 mmol/L    Base Exc, Cord Art -1 8 (L) 3 0 - 11 0 mmol/L    O2 Content, Cord Art 9 1 ml/dl    O2 Hgb, Arterial Cord 44 5 %   CBC and differential    Collection Time: 06/04/21  5:17 AM Result Value Ref Range    WBC 8 93 4 31 - 10 16 Thousand/uL    RBC 3 22 (L) 3 81 - 5 12 Million/uL    Hemoglobin 8 9 (L) 11 5 - 15 4 g/dL    Hematocrit 27 3 (L) 34 8 - 46 1 %    MCV 85 82 - 98 fL    MCH 27 6 26 8 - 34 3 pg    MCHC 32 6 31 4 - 37 4 g/dL    RDW 12 8 11 6 - 15 1 %    MPV 13 7 (H) 8 9 - 12 7 fL    Platelets 717 607 - 163 Thousands/uL    nRBC 0 /100 WBCs    Neutrophils Relative 73 43 - 75 %    Immat GRANS % 1 0 - 2 %    Lymphocytes Relative 16 14 - 44 %    Monocytes Relative 9 4 - 12 %    Eosinophils Relative 1 0 - 6 %    Basophils Relative 0 0 - 1 %    Neutrophils Absolute 6 58 1 85 - 7 62 Thousands/µL    Immature Grans Absolute 0 09 0 00 - 0 20 Thousand/uL    Lymphocytes Absolute 1 40 0 60 - 4 47 Thousands/µL    Monocytes Absolute 0 78 0 17 - 1 22 Thousand/µL    Eosinophils Absolute 0 05 0 00 - 0 61 Thousand/µL    Basophils Absolute 0 03 0 00 - 0 10 Thousands/µL         [facility]  Ob/Gyn PGY-1  6/4/2021 thoracic spine

## 2021-08-02 ENCOUNTER — RX RENEWAL (OUTPATIENT)
Age: 41
End: 2021-08-02

## 2021-12-14 ENCOUNTER — RX RENEWAL (OUTPATIENT)
Age: 41
End: 2021-12-14

## 2021-12-20 ENCOUNTER — RX RENEWAL (OUTPATIENT)
Age: 41
End: 2021-12-20

## 2021-12-20 RX ORDER — IRBESARTAN AND HYDROCHLOROTHIAZIDE 300; 12.5 MG/1; MG/1
300-12.5 TABLET ORAL
Qty: 90 | Refills: 1 | Status: ACTIVE | COMMUNITY
Start: 2020-12-24 | End: 1900-01-01

## 2022-08-09 ENCOUNTER — OUTPATIENT (OUTPATIENT)
Dept: OUTPATIENT SERVICES | Facility: HOSPITAL | Age: 42
LOS: 1 days | Discharge: HOME | End: 2022-08-09

## 2022-08-09 DIAGNOSIS — R10.9 UNSPECIFIED ABDOMINAL PAIN: ICD-10-CM

## 2022-08-09 PROCEDURE — 76700 US EXAM ABDOM COMPLETE: CPT | Mod: 26

## 2022-09-06 ENCOUNTER — RX RENEWAL (OUTPATIENT)
Age: 42
End: 2022-09-06

## 2022-11-14 ENCOUNTER — RX RENEWAL (OUTPATIENT)
Age: 42
End: 2022-11-14

## 2023-01-04 ENCOUNTER — NON-APPOINTMENT (OUTPATIENT)
Age: 43
End: 2023-01-04

## 2023-03-27 ENCOUNTER — RX RENEWAL (OUTPATIENT)
Age: 43
End: 2023-03-27

## 2023-05-18 NOTE — ED ADULT NURSE NOTE - CAS DISCH CONDITION
Discharge home , states understanding to follow up as directed. Ambulates out of ED without difficulty. Extra dressing supplies given    Stable

## 2023-05-25 ENCOUNTER — RX RENEWAL (OUTPATIENT)
Age: 43
End: 2023-05-25

## 2023-05-25 RX ORDER — AMLODIPINE BESYLATE 5 MG/1
5 TABLET ORAL DAILY
Qty: 10 | Refills: 0 | Status: ACTIVE | COMMUNITY
Start: 2021-04-20 | End: 1900-01-01

## 2023-10-25 ENCOUNTER — NON-APPOINTMENT (OUTPATIENT)
Age: 43
End: 2023-10-25

## 2023-11-11 ENCOUNTER — NON-APPOINTMENT (OUTPATIENT)
Age: 43
End: 2023-11-11

## 2024-06-25 ENCOUNTER — OUTPATIENT (OUTPATIENT)
Dept: OUTPATIENT SERVICES | Facility: HOSPITAL | Age: 44
LOS: 1 days | End: 2024-06-25
Payer: COMMERCIAL

## 2024-06-25 DIAGNOSIS — R07.9 CHEST PAIN, UNSPECIFIED: ICD-10-CM

## 2024-06-25 DIAGNOSIS — Z00.8 ENCOUNTER FOR OTHER GENERAL EXAMINATION: ICD-10-CM

## 2024-06-25 PROCEDURE — 75574 CT ANGIO HRT W/3D IMAGE: CPT | Mod: 26

## 2024-06-25 PROCEDURE — 75574 CT ANGIO HRT W/3D IMAGE: CPT

## 2024-06-26 DIAGNOSIS — R07.9 CHEST PAIN, UNSPECIFIED: ICD-10-CM

## 2024-07-18 ENCOUNTER — OUTPATIENT (OUTPATIENT)
Dept: OUTPATIENT SERVICES | Facility: HOSPITAL | Age: 44
LOS: 1 days | End: 2024-07-18
Payer: COMMERCIAL

## 2024-07-18 DIAGNOSIS — R07.9 CHEST PAIN, UNSPECIFIED: ICD-10-CM

## 2024-07-18 PROCEDURE — 75580 N-INVAS EST C FFR SW ALY CTA: CPT

## 2024-07-18 PROCEDURE — 75580 N-INVAS EST C FFR SW ALY CTA: CPT | Mod: 26

## 2024-07-19 DIAGNOSIS — R07.9 CHEST PAIN, UNSPECIFIED: ICD-10-CM

## 2024-10-14 ENCOUNTER — EMERGENCY (EMERGENCY)
Facility: HOSPITAL | Age: 44
LOS: 0 days | Discharge: HOME | End: 2024-10-14
Attending: STUDENT IN AN ORGANIZED HEALTH CARE EDUCATION/TRAINING PROGRAM
Payer: COMMERCIAL

## 2024-10-14 VITALS
WEIGHT: 184.97 LBS | TEMPERATURE: 99 F | OXYGEN SATURATION: 97 % | SYSTOLIC BLOOD PRESSURE: 186 MMHG | RESPIRATION RATE: 18 BRPM | HEIGHT: 66 IN | DIASTOLIC BLOOD PRESSURE: 98 MMHG | HEART RATE: 84 BPM

## 2024-10-14 DIAGNOSIS — I10 ESSENTIAL (PRIMARY) HYPERTENSION: ICD-10-CM

## 2024-10-14 DIAGNOSIS — R06.02 SHORTNESS OF BREATH: ICD-10-CM

## 2024-10-14 DIAGNOSIS — Z88.0 ALLERGY STATUS TO PENICILLIN: ICD-10-CM

## 2024-10-14 DIAGNOSIS — R07.2 PRECORDIAL PAIN: ICD-10-CM

## 2024-10-14 DIAGNOSIS — E11.9 TYPE 2 DIABETES MELLITUS WITHOUT COMPLICATIONS: ICD-10-CM

## 2024-10-14 DIAGNOSIS — J45.909 UNSPECIFIED ASTHMA, UNCOMPLICATED: ICD-10-CM

## 2024-10-14 LAB
ALBUMIN SERPL ELPH-MCNC: 4.8 G/DL — SIGNIFICANT CHANGE UP (ref 3.5–5.2)
ALP SERPL-CCNC: 87 U/L — SIGNIFICANT CHANGE UP (ref 30–115)
ALT FLD-CCNC: 22 U/L — SIGNIFICANT CHANGE UP (ref 0–41)
ANION GAP SERPL CALC-SCNC: 10 MMOL/L — SIGNIFICANT CHANGE UP (ref 7–14)
AST SERPL-CCNC: 16 U/L — SIGNIFICANT CHANGE UP (ref 0–41)
BASOPHILS # BLD AUTO: 0.03 K/UL — SIGNIFICANT CHANGE UP (ref 0–0.2)
BASOPHILS NFR BLD AUTO: 0.3 % — SIGNIFICANT CHANGE UP (ref 0–1)
BILIRUB SERPL-MCNC: 0.7 MG/DL — SIGNIFICANT CHANGE UP (ref 0.2–1.2)
BUN SERPL-MCNC: 16 MG/DL — SIGNIFICANT CHANGE UP (ref 10–20)
CALCIUM SERPL-MCNC: 9.7 MG/DL — SIGNIFICANT CHANGE UP (ref 8.4–10.4)
CHLORIDE SERPL-SCNC: 96 MMOL/L — LOW (ref 98–110)
CO2 SERPL-SCNC: 28 MMOL/L — SIGNIFICANT CHANGE UP (ref 17–32)
CREAT SERPL-MCNC: 0.9 MG/DL — SIGNIFICANT CHANGE UP (ref 0.7–1.5)
EGFR: 80 ML/MIN/1.73M2 — SIGNIFICANT CHANGE UP
EOSINOPHIL # BLD AUTO: 0.54 K/UL — SIGNIFICANT CHANGE UP (ref 0–0.7)
EOSINOPHIL NFR BLD AUTO: 5.2 % — SIGNIFICANT CHANGE UP (ref 0–8)
GLUCOSE SERPL-MCNC: 120 MG/DL — HIGH (ref 70–99)
HCT VFR BLD CALC: 40.6 % — SIGNIFICANT CHANGE UP (ref 37–47)
HGB BLD-MCNC: 13.9 G/DL — SIGNIFICANT CHANGE UP (ref 12–16)
IMM GRANULOCYTES NFR BLD AUTO: 0.5 % — HIGH (ref 0.1–0.3)
LYMPHOCYTES # BLD AUTO: 2.18 K/UL — SIGNIFICANT CHANGE UP (ref 1.2–3.4)
LYMPHOCYTES # BLD AUTO: 21.1 % — SIGNIFICANT CHANGE UP (ref 20.5–51.1)
MCHC RBC-ENTMCNC: 29.5 PG — SIGNIFICANT CHANGE UP (ref 27–31)
MCHC RBC-ENTMCNC: 34.2 G/DL — SIGNIFICANT CHANGE UP (ref 32–37)
MCV RBC AUTO: 86.2 FL — SIGNIFICANT CHANGE UP (ref 81–99)
MONOCYTES # BLD AUTO: 0.9 K/UL — HIGH (ref 0.1–0.6)
MONOCYTES NFR BLD AUTO: 8.7 % — SIGNIFICANT CHANGE UP (ref 1.7–9.3)
NEUTROPHILS # BLD AUTO: 6.64 K/UL — HIGH (ref 1.4–6.5)
NEUTROPHILS NFR BLD AUTO: 64.2 % — SIGNIFICANT CHANGE UP (ref 42.2–75.2)
NRBC # BLD: 0 /100 WBCS — SIGNIFICANT CHANGE UP (ref 0–0)
PLATELET # BLD AUTO: 343 K/UL — SIGNIFICANT CHANGE UP (ref 130–400)
PMV BLD: 8.6 FL — SIGNIFICANT CHANGE UP (ref 7.4–10.4)
POTASSIUM SERPL-MCNC: 3.8 MMOL/L — SIGNIFICANT CHANGE UP (ref 3.5–5)
POTASSIUM SERPL-SCNC: 3.8 MMOL/L — SIGNIFICANT CHANGE UP (ref 3.5–5)
PROT SERPL-MCNC: 7.6 G/DL — SIGNIFICANT CHANGE UP (ref 6–8)
RBC # BLD: 4.71 M/UL — SIGNIFICANT CHANGE UP (ref 4.2–5.4)
RBC # FLD: 12.2 % — SIGNIFICANT CHANGE UP (ref 11.5–14.5)
SODIUM SERPL-SCNC: 134 MMOL/L — LOW (ref 135–146)
TROPONIN T, HIGH SENSITIVITY RESULT: <6 NG/L — SIGNIFICANT CHANGE UP (ref 6–13)
TROPONIN T, HIGH SENSITIVITY RESULT: <6 NG/L — SIGNIFICANT CHANGE UP (ref 6–13)
WBC # BLD: 10.34 K/UL — SIGNIFICANT CHANGE UP (ref 4.8–10.8)
WBC # FLD AUTO: 10.34 K/UL — SIGNIFICANT CHANGE UP (ref 4.8–10.8)

## 2024-10-14 PROCEDURE — 71045 X-RAY EXAM CHEST 1 VIEW: CPT | Mod: 26

## 2024-10-14 PROCEDURE — 71045 X-RAY EXAM CHEST 1 VIEW: CPT

## 2024-10-14 PROCEDURE — 36415 COLL VENOUS BLD VENIPUNCTURE: CPT

## 2024-10-14 PROCEDURE — 93010 ELECTROCARDIOGRAM REPORT: CPT

## 2024-10-14 PROCEDURE — 93005 ELECTROCARDIOGRAM TRACING: CPT

## 2024-10-14 PROCEDURE — 80053 COMPREHEN METABOLIC PANEL: CPT

## 2024-10-14 PROCEDURE — 84484 ASSAY OF TROPONIN QUANT: CPT

## 2024-10-14 PROCEDURE — 85025 COMPLETE CBC W/AUTO DIFF WBC: CPT

## 2024-10-14 PROCEDURE — 99285 EMERGENCY DEPT VISIT HI MDM: CPT

## 2024-10-14 PROCEDURE — 96374 THER/PROPH/DIAG INJ IV PUSH: CPT

## 2024-10-14 PROCEDURE — 99285 EMERGENCY DEPT VISIT HI MDM: CPT | Mod: 25

## 2024-10-14 RX ORDER — KETOROLAC TROMETHAMINE 10 MG/1
15 TABLET, FILM COATED ORAL ONCE
Refills: 0 | Status: DISCONTINUED | OUTPATIENT
Start: 2024-10-14 | End: 2024-10-14

## 2024-10-14 RX ORDER — ACETAMINOPHEN 325 MG
650 TABLET ORAL ONCE
Refills: 0 | Status: COMPLETED | OUTPATIENT
Start: 2024-10-14 | End: 2024-10-14

## 2024-10-14 RX ADMIN — Medication 650 MILLIGRAM(S): at 15:31

## 2024-10-14 RX ADMIN — KETOROLAC TROMETHAMINE 15 MILLIGRAM(S): 10 TABLET, FILM COATED ORAL at 15:31

## 2024-10-14 NOTE — ED PROVIDER NOTE - PROGRESS NOTE DETAILS
GT: Reassessed, patient states cp improved after being given meds. will get delta trop. GT: Negative delta trop. Patient re-evaluated at bedside. Eating apple and feeling better. Will plan for discharge with strict return precautions and follow up with outpatient cardiology.

## 2024-10-14 NOTE — ED PROVIDER NOTE - OBJECTIVE STATEMENT
45 y/o male PMH HTN, DM, Asthma presents to the ED with midsternal CP that began 30 minutes PTA. The patient was sitting at home when chest pain began. Her CP is described as a stabbing pain "like a knife" that radiates to her left shoulder. She reports associated shortness of breath. No diaphoresis. No nausea, vomiting, fevers, chills, or abdominal pain. No back pain. No recent illnesses or sick contacts. The patient states she was seen for chest discomfort 2 months ago at [facility] and had seen a cardiologist there at the time. She had a stress test done at the time which was normal. She does not know if she ever received an angio study but states that the cardiologist had put her on a statin. She does not smoke or use recreational drugs. She does not have any other complaints at this time.

## 2024-10-14 NOTE — ED PROVIDER NOTE - ATTENDING CONTRIBUTION TO CARE
44F with DM HTN here with sharp chest pain started this afternoon. worse with movement. feels like the pain islimited her SOB. Denies nausea, diaphoresis radation of pain.has outpatient cardiology. has stress test in [place] (Dr Yeh reviewed results) normal in 2024. Pt reports pain similar to prior. Patient denies history of blood clots, prolong travel, recent surgery, hormone use, immobilization. HR<100    EKG interpretation - rate 83, NSR, no HAL or tw ave abnormalities.   Dr Yeh       Plan for troponin x2, Cxr. PE screening negative

## 2024-10-14 NOTE — ED ADULT NURSE NOTE - NSFALLUNIVINTERV_ED_ALL_ED
Bed/Stretcher in lowest position, wheels locked, appropriate side rails in place/Call bell, personal items and telephone in reach/Instruct patient to call for assistance before getting out of bed/chair/stretcher/Non-slip footwear applied when patient is off stretcher/[place] to call system/Physically safe environment - no spills, clutter or unnecessary equipment/Purposeful proactive rounding/Room/bathroom lighting operational, light cord in reach

## 2024-10-14 NOTE — ED PROVIDER NOTE - CLINICAL SUMMARY MEDICAL DECISION MAKING FREE TEXT BOX
Low risk chest pain with normal workup. Plan for dc with outpatient cardiology referral given recent cardiologist testing negative.

## 2024-10-14 NOTE — ED PROVIDER NOTE - NSFOLLOWUPINSTRUCTIONS_ED_ALL_ED_FT
Our Emergency Department Referral Coordinators will be reaching out to you in the next 24-48 hours from 9:00am to 5:00pm to schedule a follow up appointment. Please expect a phone call from the hospital in that time frame. If you do not receive a call or if you have any questions or concerns, you can reach them at   [phone]      Chest pain can be caused by many different conditions which may or may not be dangerous. Causes include heartburn, lung infections, heart attack, blood clot in lungs, skin infections, strain or damage to muscle, cartilage, or bones, etc. In addition to a history and physical examination, an electrocardiogram (ECG) or other lab tests may have been performed to determine the cause of your chest pain. Follow up with your primary care provider or with a cardiologist as instructed.     SEEK IMMEDIATE MEDICAL CARE IF YOU HAVE ANY OF THE FOLLOWING SYMPTOMS: worsening chest pain, coughing up blood, unexplained back/neck/jaw pain, severe abdominal pain, dizziness or lightheadedness, fainting, shortness of breath, sweaty or clammy skin, vomiting, or racing heart beat. These symptoms may represent a serious problem that is an emergency. Do not wait to see if the symptoms will go away. Get medical help right away. Call 911 and do not drive yourself to the hospital.

## 2024-10-14 NOTE — ED PROVIDER NOTE - PATIENT PORTAL LINK FT
You can access the FollowMyHealth Patient Portal offered by [facility] by registering at the following website: [email or web address]. By joining "[employer]"’s FollowMyHealth portal, you will also be able to view your health information using other applications (apps) compatible with our system.

## 2024-10-14 NOTE — ED ADULT TRIAGE NOTE - ARRIVAL FROM
Who is calling:  Cee from  Home Care  Reason for Call:  Calling to get an update on the verbal orders as they were hoping to see him on 2/2/21.  Date of last appointment with primary care: 1/12/21  Okay to leave a detailed message: Yes       Home

## 2025-04-11 NOTE — ED ADULT NURSE NOTE - NSFALLRSKASSESASSIST_ED_ALL_ED
Iliana Moody is a 37 year old female presenting for   Chief Complaint   Patient presents with    Office Visit     Medication check     Denies Latex allergy or sensitivity.    Medication verified and med list updated  Refills needed today: No       Health Maintenance       DTaP/Tdap/Td Vaccine (4 - Td or Tdap)  Overdue since 7/13/2022    COVID-19 Vaccine (2 - 2024-25 season)  Overdue since 9/1/2024           Following review of the above:  Patient is not proceeding with: COVID-19 and Dtap/Tdap/Td    Note: Refer to final orders and clinician documentation.                                Last lab results:   No results found for: \"HGBA1C\"  Cholesterol (mg/dL)   Date Value   09/17/2024 219 (H)     HDL (mg/dL)   Date Value   09/17/2024 82     Triglycerides (mg/dL)   Date Value   09/17/2024 109     LDL (mg/dL)   Date Value   09/17/2024 115     No results found for: \"URMIC\", \"UCR\", \"MALBCR\"  No results found for: \"IFOB\"                 Depression Screening:  Review Flowsheet  More data exists         7/12/2024   PHQ 2/9 Score   Adult PHQ 2 Score 0   Adult PHQ 2 Interpretation No further screening needed   Little interest or pleasure in activity? Not at all   Feeling down, depressed or hopeless? Not at all        Advance Directives:  not discussed   no

## 2025-04-28 NOTE — ED ADULT NURSE NOTE - PAIN RATING/NUMBER SCALE (0-10): ACTIVITY
Discharge Instructions After Your Transesophageal Echocardiogram (AIDA)  Sedation medication may impair your judgment or make you feel dizzy or sleepy:  Do not drive for up to 24 hours after your procedure.  Do not operate heavy or potentially harmful equipment.  Do not make legally binding decisions.  Do not drink alcohol for up to 24 hours after your procedure  If received numbing medicine, do not eat or drink for 1 hour, since your throat will still be numb.  After this time, you may return to your regular diet.  You may have a slightly sore throat today.  To relieve the soreness, you may:   Sip cold drinks   Use a throat lozenge   Gargle with warm salt water  If you experience any nausea, eat only a light meal or snack for the remainder of the day.  Call your doctor if you have bleeding from your mouth, fever over 101º F, or sore throat lasting more than 2 days.  Your doctor’s phone number is [phone]  Call 911 if you have chest pain, shortness of breath, or an emergency.        Want to Say “Thank You” to a Nurse?  The [employer] Award® was created in memory of MILLI Kelsey by his family to say thank you to nurses who provide an outstanding level of care.    Submit a nomination using any method below.     OR    [email or web address]  Or visit the Resource section   on your [employer] dedra        Want to Say “Thank You” to a Nurse?  The [employer] Award® was created in memory of MILLI Kelsey by his family to say thank you to nurses who provide an outstanding level of care.    Thanks for letting me care for you today Sindi Ding RN    Submit a nomination using any method below.     OR    [email or web address]  Or visit the Resource section   on your [employer] dedra     
3

## 2025-06-15 ENCOUNTER — NON-APPOINTMENT (OUTPATIENT)
Age: 45
End: 2025-06-15